# Patient Record
Sex: FEMALE | Race: WHITE | HISPANIC OR LATINO | Employment: OTHER | URBAN - METROPOLITAN AREA
[De-identification: names, ages, dates, MRNs, and addresses within clinical notes are randomized per-mention and may not be internally consistent; named-entity substitution may affect disease eponyms.]

---

## 2017-04-30 ENCOUNTER — HOSPITAL ENCOUNTER (EMERGENCY)
Facility: MEDICAL CENTER | Age: 47
End: 2017-04-30
Attending: EMERGENCY MEDICINE
Payer: MEDICARE

## 2017-04-30 VITALS
WEIGHT: 180.56 LBS | HEART RATE: 87 BPM | OXYGEN SATURATION: 100 % | BODY MASS INDEX: 29.02 KG/M2 | RESPIRATION RATE: 18 BRPM | SYSTOLIC BLOOD PRESSURE: 141 MMHG | HEIGHT: 66 IN | TEMPERATURE: 97.1 F | DIASTOLIC BLOOD PRESSURE: 75 MMHG

## 2017-04-30 DIAGNOSIS — Z76.0 MEDICATION REFILL: ICD-10-CM

## 2017-04-30 DIAGNOSIS — M54.6 ACUTE LEFT-SIDED THORACIC BACK PAIN: ICD-10-CM

## 2017-04-30 DIAGNOSIS — G62.9 NEUROPATHY: ICD-10-CM

## 2017-04-30 DIAGNOSIS — F20.9 SCHIZOPHRENIA, UNSPECIFIED TYPE (HCC): ICD-10-CM

## 2017-04-30 DIAGNOSIS — R20.2 PARESTHESIAS: ICD-10-CM

## 2017-04-30 PROCEDURE — 99282 EMERGENCY DEPT VISIT SF MDM: CPT

## 2017-04-30 RX ORDER — ALBUTEROL SULFATE 90 UG/1
2 AEROSOL, METERED RESPIRATORY (INHALATION) EVERY 6 HOURS PRN
Qty: 1 INHALER | Refills: 3 | Status: SHIPPED | OUTPATIENT
Start: 2017-04-30

## 2017-04-30 RX ORDER — KETOROLAC TROMETHAMINE 30 MG/ML
60 INJECTION, SOLUTION INTRAMUSCULAR; INTRAVENOUS ONCE
Status: DISCONTINUED | OUTPATIENT
Start: 2017-04-30 | End: 2017-04-30 | Stop reason: HOSPADM

## 2017-04-30 ASSESSMENT — PAIN SCALES - GENERAL: PAINLEVEL_OUTOF10: 10

## 2017-04-30 NOTE — ED NOTES
To [facility] 55.  With pain and numbness of both arms and top of left thigh.  States the pain in her arms wakes her up at night.

## 2017-04-30 NOTE — ED AVS SNAPSHOT
Home Care Instructions                                                                                                                Diamond Lazaro   MRN: [id number]    Department:  [facility], Emergency Dept   Date of Visit:  4/30/2017            [facility], Emergency Dept    [address] [place] [id number]    Phone:  [phone]      You were seen by     Rocky Blanton M.D.      Your Diagnosis Was     Acute left-sided thoracic back pain     M54.6       Follow-up Information     1. Follow up with [facility], Emergency Dept.    Specialty:  Emergency Medicine    Why:  If symptoms worsen    Contact information    [address] [place] [id number] [phone]      Medication Information     Review all of your home medications and newly ordered medications with your primary doctor and/or pharmacist as soon as possible. Follow medication instructions as directed by your doctor and/or pharmacist.     Please keep your complete medication list with you and share with your physician. Update the information when medications are discontinued, doses are changed, or new medications (including over-the-counter products) are added; and carry medication information at all times in the event of emergency situations.               Medication List      ASK your doctor about these medications        Instructions    Morning Afternoon Evening Bedtime    * albuterol 108 (90 BASE) MCG/ACT Aers inhalation aerosol   What changed:  Another medication with the same name was added. Make sure you understand how and when to take each.   Ask about: Which instructions should I use?        Inhale 2 Puffs by mouth every 6 hours as needed for Shortness of Breath.   Dose:  2 Puff                        * albuterol 108 (90 BASE) MCG/ACT Aers inhalation aerosol   What changed:  You were already taking a medication with the same name, and this prescription was added.  Make sure you understand how and when to take each.   Ask about: Which instructions should I use?        Inhale 2 Puffs by mouth every 6 hours as needed for Shortness of Breath.   Dose:  2 Puff                        * albuterol 108 (90 BASE) MCG/ACT Aers inhalation aerosol   What changed:  You were already taking a medication with the same name, and this prescription was added. Make sure you understand how and when to take each.   Ask about: Which instructions should I use?        Inhale 2 Puffs by mouth every 6 hours as needed for Shortness of Breath.   Dose:  2 Puff                        alprazolam 1 MG Tabs   Commonly known as:  XANAX        Take 1 mg by mouth 3 times a day.   Dose:  1 mg                        budesonide-formoterol 160-4.5 MCG/ACT Aero   Commonly known as:  SYMBICORT        Inhale 2 Puffs by mouth 2 Times a Day.   Dose:  2 Puff                        fluticasone 50 MCG/ACT nasal spray   Commonly known as:  FLONASE        Spray 2 Sprays in nose every day.   Dose:  2 Spray                        nicotine 21 MG/24HR Pt24   Commonly known as:  NICODERM        Apply 1 Patch to skin as directed every 24 hours.   Dose:  1 Patch                        ziprasidone 60 MG Caps   Commonly known as:  GEODON        Take 60 mg by mouth every day.   Dose:  60 mg                        * Notice:  This list has 3 medication(s) that are the same as other medications prescribed for you. Read the directions carefully, and ask your doctor or other care provider to review them with you.         Where to Get Your Medications      These medications were sent to [facility]  [place] - [address]  [address] [place] [address]     Phone:  [phone]    - albuterol 108 (90 BASE) MCG/ACT Aers inhalation aerosol      You can get these medications from any pharmacy     Bring a paper prescription for each of these medications    - albuterol 108 (90 BASE) MCG/ACT Aers inhalation aerosol                   Discharge Instructions       Please follow-up with your primary care provider for blood pressure management.      Musculoskeletal Pain  Musculoskeletal pain is muscle and brook aches and pains. These pains can occur in any part of the body. Your caregiver may treat you without knowing the cause of the pain. They may treat you if blood or urine tests, X-rays, and other tests were normal.   CAUSES  There is often not a definite cause or reason for these pains. These pains may be caused by a type of germ (virus). The discomfort may also come from overuse. Overuse includes working out too hard when your body is not fit. Brook aches also come from weather changes. Bone is sensitive to atmospheric pressure changes.  HOME CARE INSTRUCTIONS   · Ask when your test results will be ready. Make sure you get your test results.  · Only take over-the-counter or prescription medicines for pain, discomfort, or fever as directed by your caregiver. If you were given medications for your condition, do not drive, operate machinery or power tools, or sign legal documents for 24 hours. Do not drink alcohol. Do not take sleeping pills or other medications that may interfere with treatment.  · Continue all activities unless the activities cause more pain. When the pain lessens, slowly resume normal activities. Gradually increase the intensity and duration of the activities or exercise.  · During periods of severe pain, bed rest may be helpful. Lay or sit in any position that is comfortable.  · Putting ice on the injured area.  1. Put ice in a bag.  2. Place a towel between your skin and the bag.  3. Leave the ice on for 15 to 20 minutes, 3 to 4 times a day.  · Follow up with your caregiver for continued problems and no reason can be found for the pain. If the pain becomes worse or does not go away, it may be necessary to repeat tests or do additional testing. Your caregiver may need to look further for a possible cause.  SEEK  IMMEDIATE MEDICAL CARE IF:  · You have pain that is getting worse and is not relieved by medications.  · You develop chest pain that is associated with shortness or breath, sweating, feeling sick to your stomach (nauseous), or throw up (vomit).  · Your pain becomes localized to the abdomen.  · You develop any new symptoms that seem different or that concern you.  MAKE SURE YOU:   · Understand these instructions.  · Will watch your condition.  · Will get help right away if you are not doing well or get worse.     This information is not intended to replace advice given to you by your health care provider. Make sure you discuss any questions you have with your health care provider.     Document Released: 12/18/2006 Document Revised: 03/11/2013 Document Reviewed: 08/22/2014  [employer] Interactive Patient Education ©2016 [employer].    Peripheral Nerve Problems  Peripheral nerve disorders are problems with the nerves in your arms or legs.  CAUSES   There are many different causes of these disorders. They include:  · Injury.   · Diabetes.   · Chronic alcoholism.   · Toxic chemicals and drugs.   · Vitamin deficiencies.   · Tumors.   · Liver or kidney diseases.   SYMPTOMS   Some of the problems caused are:  · Tingling, burning, pain, and numbness in the extremities and feet.   · Weakness, loss of muscle tone, and size.   DIAGNOSIS   Sometimes blood tests and studies to examine nerve function are needed to make the diagnosis.   TREATMENT   Sometimes peripheral nerve problems can be treated with vitamins, medication, and avoiding known toxins such as alcohol. Please make a follow-up appointment to be sure you are getting better with treatment.   SEEK MEDICAL CARE IF:   · You are not better after one week of treatment.   · You have worsening of problems or have breathing trouble.   Document Released: 01/25/2006 Document Revised: 03/11/2013 Document Reviewed: 12/18/2006  [facility] [employer]® Patient Information ©2013 [employer].            Patient Information     Patient Information    Following emergency treatment: all patient requiring follow-up care must return either to a private physician or a clinic if your condition worsens before you are able to obtain further medical attention, please return to the emergency room.     Billing Information    At [facility], we work to make the billing process streamlined for our patients.  Our Representatives are here to answer any questions you may have regarding your hospital bill.  If you have insurance coverage and have supplied your insurance information to us, we will submit a claim to your insurer on your behalf.  Should you have any questions regarding your bill, we can be reached online or by phone as follows:  Online: You are able pay your bills online or live chat with our representatives about any billing questions you may have. We are here to help Monday - Friday from 8:00am to 7:30pm and 9:00am - 12:00pm on Saturdays.  Please visit [email or web address]  for more information.   Phone:  [phone] or [phone]    Please note that your emergency physician, surgeon, pathologist, radiologist, anesthesiologist, and other specialists are not employed by [facility] and will therefore bill separately for their services.  Please contact them directly for any questions concerning their bills at the numbers below:     Emergency Physician Services:  [phone]  [facility]:  [phone]  [facility]:  [phone]  [facility]:  [phone]    1. Your final bill may vary from the amount quoted upon discharge if all procedures are not complete at that time, or if your doctor has additional procedures of which we are not aware. You will receive an additional bill if you return to the Emergency Department at [facility] for suture removal regardless of the facility of which the sutures were placed.     2. Please  arrange for settlement of this account at the emergency registration.    3. All self-pay accounts are due in full at the time of treatment.  If you are unable to meet this obligation then payment is expected within 4-5 days.     4. If you have had radiology studies (CT, X-ray, Ultrasound, MRI), you have received a preliminary result during your emergency department visit. Please contact the radiology department [phone] to receive a copy of your final result. Please discuss the Final result with your primary physician or with the follow up physician provided.     Crisis Hotline:  [place] Crisis Hotline:  8-673-GVDVSLC or 1-123.415.8434  [place] Crisis Hotline:    1-425.501.7735 or 469-000-5171         ED Discharge Follow Up Questions    1. In order to provide you with very good care, we would like to follow up with a phone call in the next few days.  May we have your permission to contact you?     YES /  NO    2. What is the best phone number to call you? (       )_____-__________    3. What is the best time to call you?      Morning  /  Afternoon  /  Evening                   Patient Signature:  ____________________________________________________________    Date:  ____________________________________________________________

## 2017-04-30 NOTE — ED NOTES
Amb to triage w/ c/o numbness to both arms x 2 wks & LLE x 10 days.  Pt has a hx of chronic back pain.  Pt went and saw her pain management doctor and had a epidural on 4/20.  Pt had the numbness to arms x 2wks, the LLE numbness began shortly after the epidural.  = . Neuro intact.

## 2017-04-30 NOTE — ED PROVIDER NOTES
ED Provider Note    Scribed for Rocky Blanton M.D. by Loida Camejo. 4/30/2017  11:30 AM    Primary care provider: Pcp Pt States None  Means of arrival: Walk-in   History obtained from: Patient  History limited by: None    CHIEF COMPLAINT  Chief Complaint   Patient presents with   • Numbness     BUE & LLE       HPI  Diamond Lazaro is a 46 y.o. female who presents to the Emergency Department for left upper back pain onset one month ago. Patient reports pain has been worsening since onset. Denies chest pain or difficulty breathing. She states intermittent numbness to forearm and tingling to fingers onset four days ago. Denies weakness to upper extremities. Denies diabetes.  Patient reports history of chronic back pain. She states she received an epidural from her pain management to her lumbar spine doctor ten days ago with onset numbness to bilateral arms and left lower extremity. Patient describes numbness from her elbows down diffusely throughout the arm, no focal weakness, she feels some tingling in her fingertips, all 4 of them. Patient does not report left lower extremity numbness at this time. Denies abdominal pain or dysuria. Patient states she takes percocet and xanax at this time for back pain and anxiety. She states associated constipation at this time. Denies taking laxatives for constipation.       I reviewed patient's medical records, patient has history of schizophrenia and chronic pain.     REVIEW OF SYSTEMS  Pertinent positives include numbness to forearms, tingling to fingers. Pertinent negatives include no chest pain, difficulty breathing, weakness to upper extremities, abdominal pain, dysuria.      PAST MEDICAL HISTORY   has a past medical history of ETOH abuse; Psychiatric disorder; Schizophrenia (CMS-[facility]); Insomnia; Anxiety; Bipolar 2 disorder (CMS-HCC); De Quervain's tenosynovitis; GERD (gastroesophageal reflux disease); Hyperlipidemia (6/22/2011); Cellulitis; and Chronic back  "pain.    SURGICAL HISTORY   has past surgical history that includes primary c section; other abdominal surgery; and hemorrhoidectomy.    SOCIAL HISTORY  Social History   Substance Use Topics   • Smoking status: Current Every Day Smoker -- 1.00 packs/day for 25 years     Types: Cigarettes   • Smokeless tobacco: None      Comment: using electronic cigarette   • Alcohol Use: No      Comment: h/o heavy drinking.       History   Drug Use No       FAMILY HISTORY  Family History   Problem Relation Age of Onset   • Hypertension Mother    • Lung Disease Mother      asthma   • Lung Disease Father      COPD   • Diabetes Father    • Hypertension Father        CURRENT MEDICATIONS  Home Medications     Reviewed by Renee Fishman R.N. (Registered Nurse) on 04/30/17 at 1126  Med List Status: Partial    Medication Last Dose Status    albuterol (VENTOLIN OR PROVENTIL) 108 (90 BASE) MCG/ACT AERS 12/17/2016 Active    alprazolam (XANAX) 1 MG Tab 12/16/2016 Active    budesonide-formoterol (SYMBICORT) 160-4.5 MCG/ACT Aerosol  Active    fluticasone (FLONASE) 50 MCG/ACT nasal spray  Active    nicotine (NICODERM) 21 MG/24HR PATCH 24 HR  Active    ziprasidone (GEODON) 60 MG Cap 12/16/2016 Active                ALLERGIES  No Known Allergies    PHYSICAL EXAM  VITAL SIGNS: /90 mmHg  Pulse 100  Temp(Src) 36.2 °C (97.1 °F)  Resp 18  Ht 1.676 m (5' 6\")  Wt 81.9 kg (180 lb 8.9 oz)  BMI 29.16 kg/m2  SpO2 100%  LMP 08/25/2010    Constitutional: Well developed, Well nourished, No distress, Non-toxic appearance.   HENT: Normocephalic, Atraumatic.  Oropharynx moist.   Eyes: PERRL, EOMI, Conjunctiva normal, No discharge.   CV: Good pulses  Thorax & Lungs: No respiratory distress.   Skin: Warm, Dry, No erythema, No rash.    Musculoskeletal: No major deformities noted. Tenderness to left trapezius and rhomboid area, subjective decrease sensation to bilateral forearms and hands  Neurologic: Awake, alert. Moves all extremities spontaneously. " 5/5  strength wrist extension flexion bicep and triceps  Psychiatric: odd affect, Mood normal.    COURSE & MEDICAL DECISION MAKING  Nursing notes, VS, PMSFHx reviewed in chart.    I reviewed patients medical records which patient history of schizophrenia and chronic pain.     11:30 AM - Patient seen and examined at bedside. Patient presents with numbness and tingling to upper extremities. She has history of chronic back pain. She does not have weakness to upper extremities at this time. Discussed with patient to she will be treated with Toradol for pain and she will be discharged with albuterol inhaler. I instructed patient to treat back pain with heat and to massage the area. I advised the patient to follow up with a primary care.  Patient will be treated with Toradol 30 mg.     Patient verbalizes understanding and agreement with discharge  at this time.     Decision Making:  Patient is having some symptoms consistent with bilateral upper arm peripheral neuropathy, it is not following a dermatome therefore I do not believe it is a cervical radiculopathy. I believe this is likely metabolic versus medication, she will need to follow up with her primary care physician for this. The patient does have some upper thoracic back strain, no indication for any x-rays, offer the patient Toradol however the patient refused. We have the patient a prescription for albuterol, have the patient return with worsening symptoms.    The patient will return for new or worsening symptoms and is stable at the time of discharge.    The patient is referred to a primary physician for blood pressure management, diabetic screening, and for all other preventative health concerns.    DISPOSITION:  Patient will be discharged home in stable condition.    FOLLOW UP:  [facility], Emergency Dept  [address] [facility] [address] [phone]    If symptoms worsen      OUTPATIENT MEDICATIONS:  Discharge Medication List  as of 4/30/2017 11:57 AM      START taking these medications    Details   !! albuterol 108 (90 BASE) MCG/ACT Aero Soln inhalation aerosol Inhale 2 Puffs by mouth every 6 hours as needed for Shortness of Breath., Disp-1 Inhaler, R-3, Normal      !! albuterol 108 (90 BASE) MCG/ACT Aero Soln inhalation aerosol Inhale 2 Puffs by mouth every 6 hours as needed for Shortness of Breath., Disp-1 Inhaler, R-3, Print Rx Paper       !! - Potential duplicate medications found. Please discuss with provider.          FINAL IMPRESSION  1. Acute left-sided thoracic back pain    2. Paresthesias    3. Neuropathy (CMS-HCC)    4. Schizophrenia, unspecified type (CMS-HCC)    5. Medication refill        ILoida (Scribe), am scribing for, and in the presence of, Rocky Blanton M.D..    Electronically signed by: Loida Camejo (Scribe), 4/30/2017    I, Rocky Blanton M.D. personally performed the services described in this documentation, as scribed by Loida Camejo in my presence, and it is both accurate and complete.    The note accurately reflects work and decisions made by me.  Rocky Blanton  4/30/2017  6:42 PM

## 2017-04-30 NOTE — DISCHARGE INSTRUCTIONS
Please follow-up with your primary care provider for blood pressure management.      Musculoskeletal Pain  Musculoskeletal pain is muscle and brook aches and pains. These pains can occur in any part of the body. Your caregiver may treat you without knowing the cause of the pain. They may treat you if blood or urine tests, X-rays, and other tests were normal.   CAUSES  There is often not a definite cause or reason for these pains. These pains may be caused by a type of germ (virus). The discomfort may also come from overuse. Overuse includes working out too hard when your body is not fit. Brook aches also come from weather changes. Bone is sensitive to atmospheric pressure changes.  HOME CARE INSTRUCTIONS   · Ask when your test results will be ready. Make sure you get your test results.  · Only take over-the-counter or prescription medicines for pain, discomfort, or fever as directed by your caregiver. If you were given medications for your condition, do not drive, operate machinery or power tools, or sign legal documents for 24 hours. Do not drink alcohol. Do not take sleeping pills or other medications that may interfere with treatment.  · Continue all activities unless the activities cause more pain. When the pain lessens, slowly resume normal activities. Gradually increase the intensity and duration of the activities or exercise.  · During periods of severe pain, bed rest may be helpful. Lay or sit in any position that is comfortable.  · Putting ice on the injured area.  1. Put ice in a bag.  2. Place a towel between your skin and the bag.  3. Leave the ice on for 15 to 20 minutes, 3 to 4 times a day.  · Follow up with your caregiver for continued problems and no reason can be found for the pain. If the pain becomes worse or does not go away, it may be necessary to repeat tests or do additional testing. Your caregiver may need to look further for a possible cause.  SEEK IMMEDIATE MEDICAL CARE IF:  · You have pain  that is getting worse and is not relieved by medications.  · You develop chest pain that is associated with shortness or breath, sweating, feeling sick to your stomach (nauseous), or throw up (vomit).  · Your pain becomes localized to the abdomen.  · You develop any new symptoms that seem different or that concern you.  MAKE SURE YOU:   · Understand these instructions.  · Will watch your condition.  · Will get help right away if you are not doing well or get worse.     This information is not intended to replace advice given to you by your health care provider. Make sure you discuss any questions you have with your health care provider.     Document Released: 12/18/2006 Document Revised: 03/11/2013 Document Reviewed: 08/22/2014  [employer] Interactive Patient Education ©2016 [employer] Inc.    Peripheral Nerve Problems  Peripheral nerve disorders are problems with the nerves in your arms or legs.  CAUSES   There are many different causes of these disorders. They include:  · Injury.   · Diabetes.   · Chronic alcoholism.   · Toxic chemicals and drugs.   · Vitamin deficiencies.   · Tumors.   · Liver or kidney diseases.   SYMPTOMS   Some of the problems caused are:  · Tingling, burning, pain, and numbness in the extremities and feet.   · Weakness, loss of muscle tone, and size.   DIAGNOSIS   Sometimes blood tests and studies to examine nerve function are needed to make the diagnosis.   TREATMENT   Sometimes peripheral nerve problems can be treated with vitamins, medication, and avoiding known toxins such as alcohol. Please make a follow-up appointment to be sure you are getting better with treatment.   SEEK MEDICAL CARE IF:   · You are not better after one week of treatment.   · You have worsening of problems or have breathing trouble.   Document Released: 01/25/2006 Document Revised: 03/11/2013 Document Reviewed: 12/18/2006  [employer]® Patient Information ©2013 [employer].

## 2017-04-30 NOTE — ED AVS SNAPSHOT
4/30/2017    Diamond Lazaro  [address]  [place] [address]    Dear Diamond:    [facility] wants to ensure your discharge home is safe and you or your loved ones have had all of your questions answered regarding your care after you leave the hospital.    Below is a list of resources and contact information should you have any questions regarding your hospital stay, follow-up instructions, or active medical symptoms.    Questions or Concerns Regarding… Contact   Medical Questions Related to Your Discharge  (7 days a week, 8am-5pm) Contact a Nurse Care Coordinator   [phone]   Medical Questions Not Related to Your Discharge  (24 hours a day / 7 days a week)  Contact the Nurse Health Line   [phone]    Medications or Discharge Instructions Refer to your discharge packet   or contact your [facility] Primary Care Provider   [phone]   Follow-up Appointment(s) Schedule your appointment via [employer]   or contact Scheduling [phone]   Billing Review your statement via [employer]  or contact Billing [phone]   Medical Records Review your records via [employer]   or contact Medical Records [phone]     You may receive a telephone call within two days of discharge. This call is to make certain you understand your discharge instructions and have the opportunity to have any questions answered. You can also easily access your medical information, test results and upcoming appointments via the [employer] free online health management tool. You can learn more and sign up at [facility]/[employer]. For assistance setting up your [employer] account, please call [phone].    Once again, we want to ensure your discharge home is safe and that you have a clear understanding of any next steps in your care. If you have any questions or concerns, please do not hesitate to contact us, we are here for you. Thank you for choosing [facility] for your healthcare needs.    Sincerely,    Your [facility] Healthcare Team

## 2017-04-30 NOTE — ED AVS SNAPSHOT
ShareRoot Access Code: Activation code not generated  Current ShareRoot Status: Patient Declined    Your email address is not on file at [facility].  Email Addresses are required for you to sign up for ShareRoot, please contact [phone] to verify your personal information and to provide your email address prior to attempting to register for ShareRoot.    Diamond Carvajal Tejas  [address]  [place] [address]    Pikum[employer]  A secure, online tool to manage your health information     [facility]’s ShareRoot® is a secure, online tool that connects you to your personalized health information from the privacy of your home -- day or night - making it very easy for you to manage your healthcare. Once the activation process is completed, you can even access your medical information using the ShareRoot luciano, which is available for free in the Apple Luciano store or Google Play store.     To learn more about ShareRoot, visit [email or web address]    There are two levels of access available (as shown below):   My Chart Features  [facility] Primary Care Doctor [facility]  Specialists [facility]  Urgent  Care Non-[facility] Primary Care Doctor   Email your healthcare team securely and privately 24/7 X X X    Manage appointments: schedule your next appointment; view details of past/upcoming appointments X      Request prescription refills. X      View recent personal medical records, including lab and immunizations X X X X   View health record, including health history, allergies, medications X X X X   Read reports about your outpatient visits, procedures, consult and ER notes X X X X   See your discharge summary, which is a recap of your hospital and/or ER visit that includes your diagnosis, lab results, and care plan X X  X     How to register for [employer]:  Once your e-mail address has been verified, follow the following steps to sign up for Pikumt.     1. Go to  [email or web address]  2. Click on the Sign Up Now box, which takes you to the  New Member Sign Up page. You will need to provide the following information:  a. Enter your Mobile Backstage Access Code exactly as it appears at the top of this page. (You will not need to use this code after you’ve completed the sign-up process. If you do not sign up before the expiration date, you must request a new code.)   b. Enter your date of birth.   c. Enter your home email address.   d. Click Submit, and follow the next screen’s instructions.  3. Create a Mobile Backstage ID. This will be your Mobile Backstage login ID and cannot be changed, so think of one that is secure and easy to remember.  4. Create a Mobile Backstage password. You can change your password at any time.  5. Enter your Password Reset Question and Answer. This can be used at a later time if you forget your password.   6. Enter your e-mail address. This allows you to receive e-mail notifications when new information is available in Mobile Backstage.  7. Click Sign Up. You can now view your health information.    For assistance activating your Mobile Backstage account, call [phone]

## 2017-04-30 NOTE — ED NOTES
Pt offered toradol shot but denies for it, refuses to get the shot. States that she is just gonna take aspirin at home.

## 2017-04-30 NOTE — ED NOTES
Dismissed ambulatory.  Walks with steady gait.  She verbalizes understanding of her Dc instructions.

## 2017-09-13 ENCOUNTER — HOSPITAL ENCOUNTER (EMERGENCY)
Facility: MEDICAL CENTER | Age: 47
End: 2017-09-13
Attending: EMERGENCY MEDICINE
Payer: MEDICARE

## 2017-09-13 VITALS
HEART RATE: 80 BPM | OXYGEN SATURATION: 98 % | SYSTOLIC BLOOD PRESSURE: 130 MMHG | TEMPERATURE: 98 F | BODY MASS INDEX: 29.41 KG/M2 | DIASTOLIC BLOOD PRESSURE: 70 MMHG | WEIGHT: 182.98 LBS | RESPIRATION RATE: 17 BRPM | HEIGHT: 66 IN

## 2017-09-13 DIAGNOSIS — H10.013 ACUTE FOLLICULAR CONJUNCTIVITIS OF BOTH EYES: ICD-10-CM

## 2017-09-13 PROCEDURE — 99283 EMERGENCY DEPT VISIT LOW MDM: CPT

## 2017-09-13 ASSESSMENT — PAIN SCALES - GENERAL
PAINLEVEL_OUTOF10: 6
PAINLEVEL_OUTOF10: 1

## 2017-09-13 NOTE — DISCHARGE INSTRUCTIONS
Do not use any makeup until the eye irritation resolves  Utilize cool compresses  Placed Lacri-Lube to both eyes 3 times a day  Place erythromycin ointment to both eyes 3 times a day as well  Follow-up with ophthalmology if not improved in 24-48 hours.

## 2017-09-13 NOTE — ED NOTES
"Pt amb to triage.  Chief Complaint   Patient presents with   • Eye Pain     +redness +drainage x2mo \"I got comet in my eyes 2 months ago.\"       "

## 2017-09-13 NOTE — ED PROVIDER NOTES
"ED Provider Note    CHIEF COMPLAINT  Chief Complaint   Patient presents with   • Eye Pain     +redness +drainage x2mo \"I got comet in my eyes 2 months ago.\"       HPI  Diamond Lazaro is a 46 y.o. female who presents to the emergency department bilateral eye discomfort. The patient states she's had this for approximately 2 months. She states that it may have started after she got some, in her eyes. 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 She states her eyes are blurred at times. She is unaware of any other direct trauma. She does not have any headaches. She is not any nausea vomiting. She also denies upper rest for his symptoms.    REVIEW OF SYSTEMS  No fever    PHYSICAL EXAM  VITAL SIGNS: /78   Pulse 96   Temp 36.3 °C (97.3 °F)   Resp 16   Ht 1.676 m (5' 6\")   Wt 83 kg (182 lb 15.7 oz)   LMP 08/25/2010   SpO2 96%   BMI 29.53 kg/m²   In general the patient does not appear toxic  Facial examination no erythema nor edema  Eyes the patient has bilateral conjunctival injection, there is no forcing uptake to each eye, no obvious foreign body, pupils are 2 and reactive bilaterally, extraocular motors intact bilaterally with no apparent discomfort. The intraocular pressure was 19 in the right eye and 21 in the left eye    COURSE & MEDICAL DECISION MAKING  Pertinent Labs & Imaging studies reviewed. (See chart for details)  This a 46-year-old female who presents with bilateral conjunctivitis. I suspect this is from either irritation from her makeup versus dry eyes. I will place the patient on erythromycin for any possible infectious source as this has been ongoing for quite some time. Furthermore this will help moisturize the eyes. She'll also utilize Lacri-Lube on a daily basis and cool compresses. Patient will follow-up with the ophthalmologist if she is not better in 48 hours.    FINAL IMPRESSION  1. Bilateral conjunctivitis     Disposition  The patient will be discharged in stable " condition      Electronically signed by: Farhat To, 9/13/2017 11:36 AM

## 2017-09-13 NOTE — ED NOTES
Pt verbalizes understanding of dc instructions. Pt was given the erythromycin ointment by erp and verbalizes understanding of use. Pt states that she will follow up with opth referal provided. Pt ambulatory to lobby w/o difficulty

## 2017-09-13 NOTE — ED NOTES
Before pt left she said she thought she left her white [employer] cell phone in bathroom. It was not there when we both check. Pt left phone number to call her at in case found [phone]. I found the phone and called number but not in service

## 2017-10-08 ENCOUNTER — HOSPITAL ENCOUNTER (EMERGENCY)
Facility: MEDICAL CENTER | Age: 47
End: 2017-10-08
Attending: EMERGENCY MEDICINE
Payer: MEDICARE

## 2017-10-08 VITALS
HEART RATE: 105 BPM | WEIGHT: 186.29 LBS | SYSTOLIC BLOOD PRESSURE: 145 MMHG | OXYGEN SATURATION: 99 % | RESPIRATION RATE: 18 BRPM | HEIGHT: 66 IN | DIASTOLIC BLOOD PRESSURE: 80 MMHG | BODY MASS INDEX: 29.94 KG/M2 | TEMPERATURE: 97.6 F

## 2017-10-08 DIAGNOSIS — Z72.0 TOBACCO USE: ICD-10-CM

## 2017-10-08 DIAGNOSIS — Z77.120 EXPOSURE TO MOLD: ICD-10-CM

## 2017-10-08 PROCEDURE — 99282 EMERGENCY DEPT VISIT SF MDM: CPT

## 2017-10-08 RX ORDER — HYDROCODONE BITARTRATE AND ACETAMINOPHEN 7.5; 325 MG/1; MG/1
1-2 TABLET ORAL EVERY 6 HOURS PRN
COMMUNITY

## 2017-10-08 ASSESSMENT — LIFESTYLE VARIABLES: DO YOU DRINK ALCOHOL: NO

## 2017-10-08 ASSESSMENT — PAIN SCALES - GENERAL: PAINLEVEL_OUTOF10: 10

## 2017-10-08 NOTE — ED NOTES
Pt to triage stating she found mole under the bathroom tile. Pt with blurred vision, hand tingling, and numbness to legs x 6 months. Pt believes the mole and symptoms are related. Tearful in triage.   Pt advised to return to triage nurse for any changes or concerns.

## 2017-10-08 NOTE — ED NOTES
"Pt has multiple complaints, pt c/o blurred vision, joint stiffness, body aches, hand tingling/numbness, pt is anxious at bedside and tearful, pt states she was putting a new floor in her kitchen and found black mold.  Pt believes she has been exposed for \"a very long time to it\"  "

## 2017-10-08 NOTE — ED PROVIDER NOTES
"ED Provider Note    CHIEF COMPLAINT  Chief Complaint   Patient presents with   • Blurred Vision   • Numbness   • Tingling       HPI  Diamond Lazaro is a 46 y.o. female who presents For evaluation of a constellation of symptoms including intermittent tingling to her extremities, itchy watery eyes, rashes, joint stiffness, body aches. These all been going on for multiple months, she states being evaluated here in ultimately follow-up with an ophthalmologist who has had an ulcerative eyedrops and yet her eyes remain itchy. The family doctor. She has had intermittent coughing and sneezing with some shortness of breath as well. Past medical history includes anxiety, bipolar, schizophrenia as well as alcohol methamphetamine abuse.    REVIEW OF SYSTEMS  Negative for fever, abdominal pain.    PAST MEDICAL HISTORY   has a past medical history of Anxiety; Bipolar 2 disorder (CMS-HCC); Cellulitis; Chronic back pain; De Quervain's tenosynovitis; ETOH abuse; GERD (gastroesophageal reflux disease); Hyperlipidemia (6/22/2011); Insomnia; Psychiatric disorder; and Schizophrenia (CMS-HCC).    SOCIAL HISTORY  Social History     Social History Main Topics   • Smoking status: Current Every Day Smoker     Packs/day: 1.00     Years: 25.00     Types: Cigarettes   • Smokeless tobacco: Not on file      Comment: using electronic cigarette   • Alcohol use No      Comment: h/o heavy drinking.    • Drug use: No   • Sexual activity: Not Currently      Comment: .  in halfway, stating that he gets released 6 months       SURGICAL HISTORY   has a past surgical history that includes primary c section; other abdominal surgery; and hemorrhoidectomy.    CURRENT MEDICATIONS  I personally reviewed the medication list in the charting documentation.     ALLERGIES  No Known Allergies    PHYSICAL EXAM  VITAL SIGNS: /80   Pulse (!) 105   Temp 36.4 °C (97.6 °F)   Resp 18   Ht 1.676 m (5' 6\")   Wt 84.5 kg (186 lb 4.6 oz)   LMP " 08/25/2010   SpO2 99%   BMI 30.07 kg/m²   Constitutional:Extremely anxious appearing  HENT: No signs of trauma.   Eyes: Conjunctiva normal, Non-icteric.   Chest: Normal nonlabored respirations  Skin: No erythema. Erythematous rash involving the axilla  Musculoskeletal: Good range of motion in all major joints.   Neurologic: Alert, No focal deficits noted.   Psychiatric: Anxious    COURSE & MEDICAL DECISION MAKING  Pertinent Labs & Imaging studies reviewed. (See chart for details)    Encounter Summary: This is a 46 y.o. female with multiple chronic symptoms that she thinks are secondary to mold exposure in her bathroom, on exam she is very anxious appearing reviewing her records reveals a history of schizophrenia as well as bipolar and anxiety and methamphetamine abuse. At this point, I don't any acute concerns that would prompt emergent evaluation here in the emergency department. I've contacted Central scheduling department he will come evaluate her here to help facilitate follow-up      DISPOSITION: Discharge Home      FINAL IMPRESSION  1. Exposure to mold    2. Tobacco use        This dictation was created using voice recognition software. The accuracy of the dictation is limited to the abilities of the software. I expect there may be some errors of grammar and possibly content. The nursing notes were reviewed and certain aspects of this information were incorporated into this note.    Electronically signed by: Juanjose Humphrey, 10/8/2017 3:34 PM

## 2017-10-08 NOTE — DISCHARGE INSTRUCTIONS
Return immediately to the Emergency Department if you experience continuing or worsening symptoms or if you develop any new or worsening symptoms including but not limited to fever, chest pain, difficulty breathing, any numbness/weakness/tingling, abdominal pain or any other concerning symptoms.      Mold Allergies  An allergy is an abnormal reaction to a substance by the body's defense system (immune system). Allergies can develop at any age.  WHAT CAUSES ALLERGIES?  An allergic reaction happens when the immune system mistakenly reacts to a normally harmless substance, called an allergen, as if it were harmful. The immune system releases antibodies to fight the substance. Antibodies eventually release a chemical called histamine into the bloodstream. The release of histamine is meant to protect the body from infection, but it also causes discomfort.  An allergic reaction can be triggered by:  · Eating an allergen.  · Inhaling an allergen.  · Touching an allergen.  WHAT TYPES OF ALLERGIES ARE THERE?  There are many types of allergies. Common types include:  · Seasonal allergies. People with this type of allergy are usually allergic to substances that are only present during certain seasons, such as molds and pollens.  · Food allergies.  · Drug allergies.  · Insect allergies.  · Animal dander allergies.  WHAT ARE SYMPTOMS OF ALLERGIES?  Possible allergy symptoms include:  · Swelling of the lips, face, tongue, mouth, or throat.  · Sneezing, coughing, or wheezing.  · Nasal congestion.  · Tingling in the mouth.  · Rash.  · Itching.  · Itchy, red, swollen areas of skin (hives).  · Watery eyes.  · Vomiting.  · Diarrhea.  · Dizziness.  · Lightheadedness.  · Fainting.  · Trouble breathing or swallowing.  · Chest tightness.  · Rapid heartbeat.  HOW ARE ALLERGIES DIAGNOSED?  Allergies are diagnosed with a medical and family history and one or more of the following:  · Skin tests.  · Blood tests.  · A food diary. A food diary  is a record of all the foods and drinks you have in a day and of all the symptoms you experience.  · The results of an elimination diet. An elimination diet involves eliminating foods from your diet and then adding them back in one by one to find out if a certain food causes an allergic reaction.  HOW ARE ALLERGIES TREATED?  There is no cure for allergies, but allergic reactions can be treated with medicine. Severe reactions usually need to be treated at a hospital.  HOW CAN REACTIONS BE PREVENTED?  The best way to prevent an allergic reaction is by avoiding the substance you are allergic to. Allergy shots and medicines can also help prevent reactions in some cases. People with severe allergic reactions may be able to prevent a life-threatening reaction called anaphylaxis with a medicine given right after exposure to the allergen.     This information is not intended to replace advice given to you by your health care provider. Make sure you discuss any questions you have with your health care provider.     Document Released: 03/12/2004 Document Revised: 01/08/2016 Document Reviewed: 09/29/2015  [employer] Interactive Patient Education ©2016 [employer]

## 2017-10-13 ENCOUNTER — OFFICE VISIT (OUTPATIENT)
Dept: MEDICAL GROUP | Facility: PHYSICIAN GROUP | Age: 47
End: 2017-10-13
Payer: MEDICARE

## 2017-10-13 VITALS
RESPIRATION RATE: 20 BRPM | SYSTOLIC BLOOD PRESSURE: 132 MMHG | HEIGHT: 66 IN | WEIGHT: 181 LBS | TEMPERATURE: 98.1 F | OXYGEN SATURATION: 96 % | HEART RATE: 105 BPM | BODY MASS INDEX: 29.09 KG/M2 | DIASTOLIC BLOOD PRESSURE: 82 MMHG

## 2017-10-13 DIAGNOSIS — R29.810 FACIAL DROOP: ICD-10-CM

## 2017-10-13 DIAGNOSIS — Z13.1 SCREENING FOR DIABETES MELLITUS (DM): ICD-10-CM

## 2017-10-13 DIAGNOSIS — Z13.6 SCREENING FOR CARDIOVASCULAR CONDITION: ICD-10-CM

## 2017-10-13 DIAGNOSIS — R06.02 SHORTNESS OF BREATH: ICD-10-CM

## 2017-10-13 DIAGNOSIS — Z12.31 ENCOUNTER FOR SCREENING MAMMOGRAM FOR BREAST CANCER: ICD-10-CM

## 2017-10-13 DIAGNOSIS — F41.9 ANXIETY: ICD-10-CM

## 2017-10-13 DIAGNOSIS — F20.9 SCHIZOPHRENIA, UNSPECIFIED TYPE (HCC): ICD-10-CM

## 2017-10-13 DIAGNOSIS — Z77.120 MOLD EXPOSURE: ICD-10-CM

## 2017-10-13 DIAGNOSIS — F31.81 BIPOLAR 2 DISORDER (HCC): ICD-10-CM

## 2017-10-13 PROCEDURE — 99203 OFFICE O/P NEW LOW 30 MIN: CPT | Performed by: PHYSICIAN ASSISTANT

## 2017-10-13 ASSESSMENT — PAIN SCALES - GENERAL: PAINLEVEL: NO PAIN

## 2017-10-13 ASSESSMENT — PATIENT HEALTH QUESTIONNAIRE - PHQ9: CLINICAL INTERPRETATION OF PHQ2 SCORE: 0

## 2017-10-13 NOTE — ASSESSMENT & PLAN NOTE
She states her bathroom has mold.  presents with multiple symptoms.  she has been experiencing itchy watery red eyes, fatigue, rash under left arm, joint stiffness, body aches, increased shortness of breath that subsided one week ago, numbness and tingling of bilateral upper and lower extremities, fatigue for several months and believes it's due to mold exposure in her home. She was seen and treated in the emergency room on 10/8/17.  she followed up with her ophthalmologist recently and was prescribed steroid eyedrops to help alleviate red itchy eyes.  eye symptoms have improved since using eyedrops.  she is contacted poison control and her apartment management.  2 days ago  became and sanded down baseboards and drywall.  he removed a piece the wall from the bathroom and painted over the mold that was in the kitchen. Patient denies chronic cough, nausea, vomiting, hemoptysis, night sweats, weight loss, fever, chills, wheezing, chest pain, palpitations, edema. Patient has a positive history for schizophrenia, anxiety, methamphetamine use, bipolar 2 disorder.
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
## 2018-01-16 ENCOUNTER — HOSPITAL ENCOUNTER (EMERGENCY)
Dept: HOSPITAL 8 - ED | Age: 48
Discharge: HOME | End: 2018-01-16
Payer: MEDICARE

## 2018-01-16 VITALS — WEIGHT: 179.9 LBS | BODY MASS INDEX: 28.91 KG/M2 | HEIGHT: 66 IN

## 2018-01-16 VITALS — SYSTOLIC BLOOD PRESSURE: 154 MMHG | DIASTOLIC BLOOD PRESSURE: 89 MMHG

## 2018-01-16 DIAGNOSIS — M79.1: ICD-10-CM

## 2018-01-16 DIAGNOSIS — J00: ICD-10-CM

## 2018-01-16 DIAGNOSIS — B34.9: Primary | ICD-10-CM

## 2018-01-16 PROCEDURE — 71046 X-RAY EXAM CHEST 2 VIEWS: CPT

## 2018-01-16 PROCEDURE — 99285 EMERGENCY DEPT VISIT HI MDM: CPT

## 2018-01-16 PROCEDURE — 96372 THER/PROPH/DIAG INJ SC/IM: CPT

## 2018-01-16 PROCEDURE — 87400 INFLUENZA A/B EACH AG IA: CPT

## 2018-02-19 ENCOUNTER — HOSPITAL ENCOUNTER (EMERGENCY)
Facility: MEDICAL CENTER | Age: 48
End: 2018-02-19
Payer: MEDICARE

## 2018-02-19 VITALS
WEIGHT: 199.3 LBS | HEIGHT: 66 IN | OXYGEN SATURATION: 98 % | RESPIRATION RATE: 16 BRPM | TEMPERATURE: 100.4 F | DIASTOLIC BLOOD PRESSURE: 83 MMHG | HEART RATE: 106 BPM | SYSTOLIC BLOOD PRESSURE: 142 MMHG | BODY MASS INDEX: 32.03 KG/M2

## 2018-02-19 PROCEDURE — 302449 STATCHG TRIAGE ONLY (STATISTIC)

## 2018-02-19 ASSESSMENT — PAIN SCALES - GENERAL: PAINLEVEL_OUTOF10: 10

## 2018-02-19 NOTE — ED NOTES
NO ANSWER IN WAITING ROOM, REGISTRATION SAID PT ASKED FOR A WAIT TIME RECENTLY BUT HASN'T SEEN HER.

## 2018-04-17 ENCOUNTER — HOSPITAL ENCOUNTER (OUTPATIENT)
Dept: RADIOLOGY | Facility: MEDICAL CENTER | Age: 48
End: 2018-04-17
Attending: ANESTHESIOLOGY
Payer: MEDICARE

## 2018-04-17 DIAGNOSIS — M54.5 LOW BACK PAIN, UNSPECIFIED BACK PAIN LATERALITY, UNSPECIFIED CHRONICITY, WITH SCIATICA PRESENCE UNSPECIFIED: ICD-10-CM

## 2018-04-17 PROCEDURE — 72148 MRI LUMBAR SPINE W/O DYE: CPT

## 2018-07-02 ENCOUNTER — HOSPITAL ENCOUNTER (EMERGENCY)
Facility: MEDICAL CENTER | Age: 48
End: 2018-07-02
Attending: EMERGENCY MEDICINE
Payer: MEDICARE

## 2018-07-02 VITALS
HEART RATE: 108 BPM | DIASTOLIC BLOOD PRESSURE: 92 MMHG | HEIGHT: 66 IN | WEIGHT: 181.22 LBS | BODY MASS INDEX: 29.12 KG/M2 | SYSTOLIC BLOOD PRESSURE: 144 MMHG | RESPIRATION RATE: 16 BRPM | TEMPERATURE: 98.2 F | OXYGEN SATURATION: 98 %

## 2018-07-02 DIAGNOSIS — F43.21 SITUATIONAL DEPRESSION: ICD-10-CM

## 2018-07-02 DIAGNOSIS — G89.29 CHRONIC DENTAL PAIN: ICD-10-CM

## 2018-07-02 DIAGNOSIS — K08.9 CHRONIC DENTAL PAIN: ICD-10-CM

## 2018-07-02 PROCEDURE — 99283 EMERGENCY DEPT VISIT LOW MDM: CPT

## 2018-07-02 RX ORDER — IBUPROFEN 800 MG/1
800 TABLET ORAL EVERY 8 HOURS PRN
Qty: 30 TAB | Refills: 0 | Status: SHIPPED | OUTPATIENT
Start: 2018-07-02

## 2018-07-02 RX ORDER — PENICILLIN V POTASSIUM 500 MG/1
500 TABLET ORAL 4 TIMES DAILY
Qty: 28 TAB | Refills: 0 | Status: SHIPPED | OUTPATIENT
Start: 2018-07-02 | End: 2018-07-03

## 2018-07-02 ASSESSMENT — PAIN SCALES - GENERAL
PAINLEVEL_OUTOF10: 10
PAINLEVEL_OUTOF10: 10

## 2018-07-02 NOTE — ED TRIAGE NOTES
"Diamond Lazaro  Chief Complaint   Patient presents with   • Jaw Pain     RIGHT lower jaw   • Tooth Ache     Pt ambulatory to triage with above complaint. Pt states she was seen at dentist today, and states \"I couldn't make the down payment and so they turned me away.\" Pt reports needing a tooth extraction with bone graft. Pt states she is unable to eat and can no longer tolerate the pain.    /92   Pulse (!) 108   Temp 36.8 °C (98.2 °F)   Resp 16   Ht 1.676 m (5' 6\")   Wt 82.2 kg (181 lb 3.5 oz)   LMP 08/25/2010   SpO2 98%   BMI 29.25 kg/m²     Pt informed of triage process and encouraged to notify staff of any changes or concerns. Pt verbalized understanding of instructions. Pt placed back in lobby.     "

## 2018-07-03 ENCOUNTER — OFFICE VISIT (OUTPATIENT)
Dept: URGENT CARE | Facility: CLINIC | Age: 48
End: 2018-07-03
Payer: MEDICARE

## 2018-07-03 VITALS
WEIGHT: 181 LBS | BODY MASS INDEX: 29.09 KG/M2 | HEIGHT: 66 IN | RESPIRATION RATE: 16 BRPM | TEMPERATURE: 98.7 F | HEART RATE: 95 BPM | OXYGEN SATURATION: 97 % | SYSTOLIC BLOOD PRESSURE: 104 MMHG | DIASTOLIC BLOOD PRESSURE: 76 MMHG

## 2018-07-03 DIAGNOSIS — K04.7 TOOTH ABSCESS: Primary | ICD-10-CM

## 2018-07-03 PROCEDURE — 99213 OFFICE O/P EST LOW 20 MIN: CPT | Performed by: PHYSICIAN ASSISTANT

## 2018-07-03 RX ORDER — AMOXICILLIN AND CLAVULANATE POTASSIUM 875; 125 MG/1; MG/1
1 TABLET, FILM COATED ORAL 2 TIMES DAILY
Qty: 14 TAB | Refills: 0 | Status: SHIPPED | OUTPATIENT
Start: 2018-07-03 | End: 2018-07-10

## 2018-07-03 NOTE — DISCHARGE INSTRUCTIONS
Dental Pain  Introduction  Dental pain may be caused by many things, including:  · Tooth decay (cavities or caries). Cavities cause the nerve of your tooth to be open to air and hot or cold temperatures. This can cause pain or discomfort.  · Abscess or infection. A dental abscess is an area that is full of infected pus from a bacterial infection in the inner part of the tooth (pulp). It usually happens at the end of the tooth’s root.  · Injury.  · An unknown reason (idiopathic).  Your pain may be mild or severe. It may only happen when:  · You are chewing.  · You are exposed to hot or cold temperature.  · You are eating or drinking sugary foods or beverages, such as:  ¨ Soda.  ¨ Candy.  Your pain may also be there all of the time.  Follow these instructions at home:  Watch your dental pain for any changes. Do these things to lessen your discomfort:  · Take medicines only as told by your dentist.  · If your dentist tells you to take an antibiotic medicine, finish all of it even if you start to feel better.  · Keep all follow-up visits as told by your dentist. This is important.  · Do not apply heat to the outside of your face.  · Rinse your mouth or gargle with salt water if told by your dentist. This helps with pain and swelling.  ¨ You can make salt water by adding ¼ tsp of salt to 1 cup of warm water.  · Apply ice to the painful area of your face:  ¨ Put ice in a plastic bag.  ¨ Place a towel between your skin and the bag.  ¨ Leave the ice on for 20 minutes, 2-3 times per day.  · Avoid foods or drinks that cause you pain, such as:  ¨ Very hot or very cold foods or drinks.  ¨ Sweet or sugary foods or drinks.  Contact a doctor if:  · Your pain is not helped with medicines.  · Your symptoms are worse.  · You have new symptoms.  Get help right away if:  · You cannot open your mouth.  · You are having trouble breathing or swallowing.  · You have a fever.  · Your face, neck, or jaw is puffy (swollen).  This information  is not intended to replace advice given to you by your health care provider. Make sure you discuss any questions you have with your health care provider.  Document Released: 06/05/2009 Document Revised: 05/25/2017 Document Reviewed: 12/14/2015  © 2017 [employer]

## 2018-07-03 NOTE — ED NOTES
"Patient at this time, after discussion with provider about treatment and discharge, begins walking to door to leave. When asked if she would like her discharge paperwork, patient replies, \"If you're not going to help me I'm just going to leave.\" Patient refuses discharge instructions, prescriptions, discharge vitals, and motrin as ordered by physician. Phone called and left message that her prescriptions, should she decide to take them after all, would be waiting at  if she changes her mind. Patient ambulatory out the door.  "

## 2018-07-03 NOTE — ED PROVIDER NOTES
ED Provider Note    Scribed for Lucretia Oquendo M.D. by Estuardo Grant. 7/2/2018, 5:25 PM.    Primary care provider: Caitlyn Mcelroy P.A.-C.  Means of arrival: walk in   History obtained from: patient   History limited by: none     CHIEF COMPLAINT  Chief Complaint   Patient presents with   • Jaw Pain     RIGHT lower jaw   • Tooth Ache       HPI  Diamond Lazaro is a 47 y.o. female who presents to the Emergency Department with a tooth ache and right lower jaw pain onset two months ago. She reports that she tried to see a dentist today but did not have enough money to make the down payment and her insurance will not cover the visit for another 5 months. She reports that she needs a tooth extraction with a bone graft and reports that she is unable to eat and can no longer tolerate the pain. Patient reports that she did not want to come today and only did because her mom said she needed to but does not want to be here. She denies any fever, chills, nausea, vomiting, weakness, dizziness, abdominal pain at this time.     REVIEW OF SYSTEMS  Pertinent positives include jaw pain and right sided dental pain. Pertinent negatives include no fever, chills, nausea, vomiting, weakness, dizziness, abdominal pain.   See HPI for further details.     PAST MEDICAL HISTORY   has a past medical history of Anxiety; Bipolar 2 disorder (CMS-[facility]) ([facility]); Cellulitis; Chronic back pain; De Quervain's tenosynovitis; ETOH abuse; GERD (gastroesophageal reflux disease); Hyperlipidemia (6/22/2011); Insomnia; Psychiatric disorder; and Schizophrenia (CMS-HCC) ([facility]).    SURGICAL HISTORY  Past Surgical History:   Procedure Laterality Date   • HEMORRHOIDECTOMY     • OTHER ABDOMINAL SURGERY      hemorrrhoids   • PRIMARY C SECTION         SOCIAL HISTORY  Social History   Substance Use Topics   • Smoking status: Current Every Day Smoker     Packs/day: 1.00     Years: 25.00     Types: Cigarettes   • Smokeless tobacco: Never Used   • Alcohol use No       "Comment: h/o heavy drinking.       History   Drug Use No     Comment: PCP       FAMILY HISTORY  Family History   Problem Relation Age of Onset   • Hypertension Mother    • Lung Disease Mother      asthma   • Lung Disease Father      COPD   • Diabetes Father    • Hypertension Father    • Other Sister      Fibromyalgia    • Thyroid Sister    • No Known Problems Brother    • Alzheimer's Disease Maternal Grandmother    • Diabetes Maternal Grandfather      Type II   • Lung Disease Paternal Grandmother      COPD   • No Known Problems Daughter        CURRENT MEDICATIONS  No current facility-administered medications on file prior to encounter.      Current Outpatient Prescriptions on File Prior to Encounter   Medication Sig Dispense Refill   • hydrocodone-acetaminophen (NORCO) 7.5-325 MG per tablet Take 1-2 Tabs by mouth every 6 hours as needed.     • albuterol 108 (90 BASE) MCG/ACT Aero Soln inhalation aerosol Inhale 2 Puffs by mouth every 6 hours as needed for Shortness of Breath. 1 Inhaler 3   • albuterol 108 (90 BASE) MCG/ACT Aero Soln inhalation aerosol Inhale 2 Puffs by mouth every 6 hours as needed for Shortness of Breath. 1 Inhaler 3   • alprazolam (XANAX) 1 MG Tab Take 1 mg by mouth 3 times a day.     • ziprasidone (GEODON) 60 MG Cap Take 60 mg by mouth every day.     • albuterol (VENTOLIN OR PROVENTIL) 108 (90 BASE) MCG/ACT AERS Inhale 2 Puffs by mouth every 6 hours as needed for Shortness of Breath. 8.5 g 3     ALLERGIES  No Known Allergies    PHYSICAL EXAM  VITAL SIGNS: /92   Pulse (!) 108   Temp 36.8 °C (98.2 °F)   Resp 16   Ht 1.676 m (5' 6\")   Wt 82.2 kg (181 lb 3.5 oz)   LMP 08/25/2010   SpO2 98%   BMI 29.25 kg/m²   Vitals reviewed.  Consitutional: Well-developed, well-nourished. Negative for: distress.  HENT: Tooth 29 is at the gum line with surrounding erythema but no swelling. Tooth 30 has a carry and is tender to palpation. Tongue has full excursion. No intraoral abscesses. oropharynx clear " and moist.  Eyes: Conjunctivae normal, negative for left and right eye discharge.  Neck: Range of motion normal, supple.   Musculoskeletal: Normal range of motion.  Neurological: Alert and oriented x3.  Skin: Warm, dry. Negative for: erythema, rash.  Psych: Mood/affect normal, behavior normal.    COURSE & MEDICAL DECISION MAKING  Nursing notes, VS, PMSFHx reviewed in chart.    Obtained and reviewed past medical records from 6/20 and she filled a prescription for 150 oxycodone.     5:25 PM - Patient seen and examined at bedside. The patient presents with right sided tooth and jaw pain ongoing for two months seeking dental extraction. Patient will be treated with Motrin 800 mg PO for her symptoms. I informed the patient I would right her a prescription for outpatient antibiotics and discharge home and she was advised to follow up with dentist and she was agreeable at this time.     Patient stormed out with medication or prescription or list of dental services.    The patient will return for new or worsening symptoms and is stable at the time of discharge.    DISPOSITION:  Patient will be discharged home in stable condition.    FOLLOW UP:  Caitlyn Mcelroy P.A.-C.  [address]  [facility] [address]  [facility] [id number] [phone]    Call in 1 day  for help with chronic pain      OUTPATIENT MEDICATIONS:  New Prescriptions    IBUPROFEN (MOTRIN) 800 MG TAB    Take 1 Tab by mouth every 8 hours as needed for Moderate Pain or Inflammation.    PENICILLIN V POTASSIUM (VEETID) 500 MG TAB    Take 1 Tab by mouth 4 times a day for 7 days.         FINAL IMPRESSION  1. Chronic dental pain    2. Situational depression          Estuardo HERNANDEZ (Malia), am scribing for, and in the presence of, Lucretia Oquendo M.D..    Electronically signed by: Estuardo Grant ([email or web address]), 7/2/2018    Lucretia HERNANDEZ M.D. personally performed the services described in this documentation, as scribed by Estuardo Grant in my presence, and it is both accurate and  complete.    The note accurately reflects work and decisions made by me.  Lucretia Oquendo  7/2/2018  7:03 PM

## 2018-07-04 NOTE — PATIENT INSTRUCTIONS
Abscessed Tooth  An abscessed tooth is an infection around your tooth. It may be caused by holes or damage to the tooth (cavity) or a dental disease. An abscessed tooth causes mild to very bad pain in and around the tooth. See your dentist right away if you have tooth or gum pain.  HOME CARE  · Take your medicine as told. Finish it even if you start to feel better.  · Do not drive after taking pain medicine.  · Rinse your mouth (gargle) often with salt water (¼ teaspoon salt in 8 ounces of warm water).  · Do not apply heat to the outside of your face.  GET HELP RIGHT AWAY IF:   · You have a temperature by mouth above 102° F (38.9° C), not controlled by medicine.  · You have chills and a very bad headache.  · You have problems breathing or swallowing.  · Your mouth will not open.  · You develop puffiness (swelling) on the neck or around the eye.  · Your pain is not helped by medicine.  · Your pain is getting worse instead of better.  MAKE SURE YOU:   · Understand these instructions.  · Will watch your condition.  · Will get help right away if you are not doing well or get worse.  Document Released: 06/05/2009 Document Revised: 03/11/2013 Document Reviewed: 03/27/2012  [employer]® Patient Information ©2014 [employer].

## 2018-07-04 NOTE — PROGRESS NOTES
Subjective:      Pt is a 47 y.o. female who presents with Tooth Abscess ((R) infection and needs antibiotics, swollen, cant open jaw)            HPI  Pt notes right lower jaw tooth abscess x 5 days. Pt has not taken any Rx medications for this condition. Pt states the pain is a 7/10 with opening jaw, aching in nature and worse at night. Pt denies CP, SOB, NVD, paresthesias, headaches, dizziness, change in vision, hives, or other joint pain. The pt's medication list, problem list, and allergies have been evaluated and reviewed during today's visit.    .  PMH:  Past Medical History:   Diagnosis Date   • Anxiety    • Bipolar 2 disorder (CMS-HCC) ([facility])    • Cellulitis    • Chronic back pain    • De Quervain's tenosynovitis    • ETOH abuse    • GERD (gastroesophageal reflux disease)    • Hyperlipidemia 2011   • Insomnia    • Psychiatric disorder     depression   • Schizophrenia (CMS-HCC) ([facility])        PSH:  Past Surgical History:   Procedure Laterality Date   • HEMORRHOIDECTOMY     • OTHER ABDOMINAL SURGERY      hemorrrhoids   • PRIMARY C SECTION         Fam Hx:    family history includes Alzheimer's Disease in her maternal grandmother; Diabetes in her father and maternal grandfather; Hypertension in her father and mother; Lung Disease in her father, mother, and paternal grandmother; No Known Problems in her brother and daughter; Other in her sister; Thyroid in her sister.  Family Status   Relation Status   • Mother Alive   • Father    • Sister Alive   • Brother Alive   • Maternal Grandmother    • Maternal Grandfather    • Paternal Grandmother    • Brother    • Brother Alive   • Daughter Alive       Soc HX:  Social History     Social History   • Marital status: Legally      Spouse name: N/A   • Number of children: N/A   • Years of education: N/A     Occupational History   • Not on file.     Social History Main Topics   • Smoking status: Current Every Day Smoker    Packs/day: 1.00     Years: 25.00     Types: Cigarettes   • Smokeless tobacco: Never Used   • Alcohol use No      Comment: h/o heavy drinking.    • Drug use: No      Comment: PCP   • Sexual activity: Not Currently      Comment: .  in prison     Other Topics Concern   • Not on file     Social History Narrative   • No narrative on file         Medications:    Current Outpatient Prescriptions:   •  amoxicillin-clavulanate (AUGMENTIN) 875-125 MG Tab, Take 1 Tab by mouth 2 times a day for 7 days., Disp: 14 Tab, Rfl: 0  •  ibuprofen (MOTRIN) 800 MG Tab, Take 1 Tab by mouth every 8 hours as needed for Moderate Pain or Inflammation., Disp: 30 Tab, Rfl: 0  •  hydrocodone-acetaminophen (NORCO) 7.5-325 MG per tablet, Take 1-2 Tabs by mouth every 6 hours as needed., Disp: , Rfl:   •  albuterol 108 (90 BASE) MCG/ACT Aero Soln inhalation aerosol, Inhale 2 Puffs by mouth every 6 hours as needed for Shortness of Breath., Disp: 1 Inhaler, Rfl: 3  •  albuterol 108 (90 BASE) MCG/ACT Aero Soln inhalation aerosol, Inhale 2 Puffs by mouth every 6 hours as needed for Shortness of Breath., Disp: 1 Inhaler, Rfl: 3  •  alprazolam (XANAX) 1 MG Tab, Take 1 mg by mouth 3 times a day., Disp: , Rfl:   •  ziprasidone (GEODON) 60 MG Cap, Take 60 mg by mouth every day., Disp: , Rfl:   •  albuterol (VENTOLIN OR PROVENTIL) 108 (90 BASE) MCG/ACT AERS, Inhale 2 Puffs by mouth every 6 hours as needed for Shortness of Breath., Disp: 8.5 g, Rfl: 3      Allergies:  Patient has no known allergies.    ROS  Constitutional: Negative for fever, chills and malaise/fatigue.   HENT: Negative for congestion and sore throat.  +tooth abscess  Eyes: Negative for blurred vision, double vision and photophobia.   Respiratory: Negative for cough and shortness of breath.    Cardiovascular: Negative for chest pain and palpitations.   Gastrointestinal: Negative for heartburn, nausea, vomiting, abdominal pain, diarrhea and constipation.   Genitourinary: Negative  "for dysuria and flank pain.   Musculoskeletal: Negative for joint pain and myalgias.   Skin: Negative for itching and rash.   Neurological: Negative for dizziness, tingling and headaches.   Endo/Heme/Allergies: Does not bruise/bleed easily.   Psychiatric/Behavioral: Negative for depression. The patient is not nervous/anxious.           Objective:     /76   Pulse 95   Temp 37.1 °C (98.7 °F)   Resp 16   Ht 1.676 m (5' 6\")   Wt 82.1 kg (181 lb)   LMP 08/25/2010   SpO2 97%   BMI 29.21 kg/m²      Physical Exam   HENT:   Mouth/Throat: Oropharynx is clear and moist and mucous membranes are normal. She does not have dentures. No oral lesions. No trismus in the jaw. Abnormal dentition. Dental abscesses and dental caries present. No uvula swelling or lacerations.              Constitutional: PT is oriented to person, place, and time. PT appears well-developed and well-nourished. No distress.   HENT:   Head: Normocephalic and atraumatic.   Eyes: Conjunctivae normal and EOM are normal. Pupils are equal, round, and reactive to light.   Neck: Normal range of motion. Neck supple. No thyromegaly present.   Cardiovascular: Normal rate, regular rhythm, normal heart sounds and intact distal pulses.  Exam reveals no gallop and no friction rub.    No murmur heard.  Pulmonary/Chest: Effort normal and breath sounds normal. No respiratory distress. PT has no wheezes. PT has no rales. Pt exhibits no tenderness.   Abdominal: Soft. Bowel sounds are normal. PT exhibits no distension and no mass. There is no tenderness. There is no rebound and no guarding.   Musculoskeletal: Normal range of motion. PT exhibits no edema and no tenderness.   Neurological: PT is alert and oriented to person, place, and time. PT has normal reflexes. No cranial nerve deficit.   Skin: Skin is warm and dry. No rash noted. PT is not diaphoretic. No erythema.       Psychiatric: PT has a normal mood and affect. PT behavior is normal. Judgment and thought " content normal.        Assessment/Plan:     1. Tooth abscess    - amoxicillin-clavulanate (AUGMENTIN) 875-125 MG Tab; Take 1 Tab by mouth 2 times a day for 7 days.  Dispense: 14 Tab; Refill: 0    Rest, fluids encouraged.  OTC NSAIDs for pain   Pt to follow up with dentist on 7/17/18  AVS with medical info given.  Pt was in full understanding and agreement with the plan.  Follow-up as needed if symptoms worsen or fail to improve.

## 2018-07-09 ENCOUNTER — OFFICE VISIT (OUTPATIENT)
Dept: URGENT CARE | Facility: CLINIC | Age: 48
End: 2018-07-09
Payer: MEDICARE

## 2018-07-09 VITALS
SYSTOLIC BLOOD PRESSURE: 132 MMHG | TEMPERATURE: 98.6 F | DIASTOLIC BLOOD PRESSURE: 90 MMHG | HEART RATE: 109 BPM | RESPIRATION RATE: 16 BRPM | HEIGHT: 66 IN | BODY MASS INDEX: 29.09 KG/M2 | WEIGHT: 181 LBS | OXYGEN SATURATION: 97 %

## 2018-07-09 DIAGNOSIS — H10.023 OTHER MUCOPURULENT CONJUNCTIVITIS OF BOTH EYES: ICD-10-CM

## 2018-07-09 PROCEDURE — 99214 OFFICE O/P EST MOD 30 MIN: CPT | Performed by: EMERGENCY MEDICINE

## 2018-07-09 RX ORDER — MOXIFLOXACIN 5 MG/ML
1 SOLUTION/ DROPS OPHTHALMIC 3 TIMES DAILY
Qty: 1 BOTTLE | Refills: 0 | Status: SHIPPED | OUTPATIENT
Start: 2018-07-09

## 2018-07-09 RX ORDER — KETOROLAC TROMETHAMINE 5 MG/ML
1 SOLUTION OPHTHALMIC 4 TIMES DAILY
Qty: 1 BOTTLE | Refills: 0 | Status: SHIPPED | OUTPATIENT
Start: 2018-07-09

## 2018-07-09 ASSESSMENT — ENCOUNTER SYMPTOMS
NAUSEA: 0
BLURRED VISION: 1
EYE REDNESS: 1
EYE DISCHARGE: 1
DOUBLE VISION: 0
EYE PAIN: 1
FEVER: 0
FOREIGN BODY SENSATION: 0
VOMITING: 0
EYE ITCHING: 0

## 2018-07-09 NOTE — PROGRESS NOTES
Subjective:      Diamond Lazaro is a 47 y.o. female who presents with Eye Problem (exposed to mold is causing redness and blurry vision x 3 weeks )            Eye Problem    Both eyes are affected.This is a recurrent problem. Episode onset: 3 weeks. The problem has been unchanged. There was no injury mechanism. There is no known exposure to pink eye. She does not wear contacts. Associated symptoms include blurred vision, an eye discharge and eye redness. Pertinent negatives include no double vision, fever, foreign body sensation, itching, nausea, recent URI or vomiting. She has tried nothing for the symptoms.   Patient notes similar episodes in past, corresponding with potential reaction to mold exposure. Has seen ophthalmologist in the past; treatment successful, but symptoms recurrent.    Review of Systems   Constitutional: Negative for fever.   Eyes: Positive for blurred vision, pain, discharge and redness. Negative for double vision and itching.   Gastrointestinal: Negative for nausea and vomiting.   Skin: Negative for rash.     PMH:  has a past medical history of Anxiety; Bipolar 2 disorder ([facility]); Cellulitis; Chronic back pain; De Quervain's tenosynovitis; ETOH abuse; GERD (gastroesophageal reflux disease); Hyperlipidemia (6/22/2011); Insomnia; Psychiatric disorder; and Schizophrenia ([facility]).  MEDS:   Current Outpatient Prescriptions:   •  moxifloxacin (VIGAMOX) 0.5 % Solution, Place 1 Drop in both eyes 3 times a day., Disp: 1 Bottle, Rfl: 0  •  ketorolac (ACULAR) 0.5 % Solution, Place 1 Drop in both eyes 4 times a day., Disp: 1 Bottle, Rfl: 0  •  amoxicillin-clavulanate (AUGMENTIN) 875-125 MG Tab, Take 1 Tab by mouth 2 times a day for 7 days., Disp: 14 Tab, Rfl: 0  •  ibuprofen (MOTRIN) 800 MG Tab, Take 1 Tab by mouth every 8 hours as needed for Moderate Pain or Inflammation., Disp: 30 Tab, Rfl: 0  •  hydrocodone-acetaminophen (NORCO) 7.5-325 MG per tablet, Take 1-2 Tabs by mouth every 6 hours as  "needed., Disp: , Rfl:   •  albuterol 108 (90 BASE) MCG/ACT Aero Soln inhalation aerosol, Inhale 2 Puffs by mouth every 6 hours as needed for Shortness of Breath., Disp: 1 Inhaler, Rfl: 3  •  albuterol 108 (90 BASE) MCG/ACT Aero Soln inhalation aerosol, Inhale 2 Puffs by mouth every 6 hours as needed for Shortness of Breath., Disp: 1 Inhaler, Rfl: 3  •  alprazolam (XANAX) 1 MG Tab, Take 1 mg by mouth 3 times a day., Disp: , Rfl:   •  ziprasidone (GEODON) 60 MG Cap, Take 60 mg by mouth every day., Disp: , Rfl:   •  albuterol (VENTOLIN OR PROVENTIL) 108 (90 BASE) MCG/ACT AERS, Inhale 2 Puffs by mouth every 6 hours as needed for Shortness of Breath., Disp: 8.5 g, Rfl: 3  ALLERGIES: No Known Allergies  SURGHX:   Past Surgical History:   Procedure Laterality Date   • HEMORRHOIDECTOMY     • OTHER ABDOMINAL SURGERY      hemorrrhoids   • PRIMARY C SECTION       SOCHX:  reports that she has been smoking Cigarettes.  She has a 25.00 pack-year smoking history. She has never used smokeless tobacco. She reports that she does not use drugs.  FH: family history includes Alzheimer's Disease in her maternal grandmother; Diabetes in her father and maternal grandfather; Hypertension in her father and mother; Lung Disease in her father, mother, and paternal grandmother; No Known Problems in her brother and daughter; Other in her sister; Thyroid in her sister.     Objective:     /90   Pulse (!) 109   Temp 37 °C (98.6 °F)   Resp 16   Ht 1.676 m (5' 6\")   Wt 82.1 kg (181 lb)   LMP 08/25/2010   SpO2 97%   BMI 29.21 kg/m²      Physical Exam   Constitutional: She is oriented to person, place, and time. She appears well-developed and well-nourished. She is cooperative. She does not have a sickly appearance. She does not appear ill.   HENT:   Head: Normocephalic and atraumatic.   Nose: No rhinorrhea.   Mouth/Throat: Mucous membranes are normal.   Eyes: EOM and lids are normal. Right eye exhibits discharge. Right eye exhibits no " chemosis, no exudate and no hordeolum. Left eye exhibits discharge. Left eye exhibits no chemosis, no exudate and no hordeolum. Right conjunctiva is injected. Right conjunctiva has no hemorrhage. Left conjunctiva is injected. Left conjunctiva has no hemorrhage.   No clear fluorescein corneal uptake with limited magnification.   Neck: Phonation normal.   Pulmonary/Chest: Effort normal.   Lymphadenopathy:        Head (right side): No preauricular adenopathy present.        Head (left side): No preauricular adenopathy present.   Neurological: She is alert and oriented to person, place, and time.   Skin: Skin is warm and dry. No rash noted.   Psychiatric: She has a normal mood and affect.               Assessment/Plan:     1. Other mucopurulent conjunctivitis of both eyes  - moxifloxacin (VIGAMOX) 0.5 % Solution; Place 1 Drop in both eyes 3 times a day.  Dispense: 1 Bottle; Refill: 0  - ketorolac (ACULAR) 0.5 % Solution; Place 1 Drop in both eyes 4 times a day.  Dispense: 1 Bottle; Refill: 0  - REFERRAL TO OPHTHALMOLOGY

## 2018-07-11 ENCOUNTER — HOSPITAL ENCOUNTER (EMERGENCY)
Dept: HOSPITAL 8 - ED | Age: 48
Discharge: HOME | End: 2018-07-11
Payer: MEDICARE

## 2018-07-11 VITALS — DIASTOLIC BLOOD PRESSURE: 87 MMHG | SYSTOLIC BLOOD PRESSURE: 135 MMHG

## 2018-07-11 VITALS — HEIGHT: 67 IN | WEIGHT: 180.78 LBS | BODY MASS INDEX: 28.37 KG/M2

## 2018-07-11 DIAGNOSIS — H10.33: Primary | ICD-10-CM

## 2018-07-11 DIAGNOSIS — F17.210: ICD-10-CM

## 2018-07-11 DIAGNOSIS — Z76.0: ICD-10-CM

## 2018-07-11 PROCEDURE — 99283 EMERGENCY DEPT VISIT LOW MDM: CPT

## 2018-07-12 ENCOUNTER — OFFICE VISIT (OUTPATIENT)
Dept: MEDICAL GROUP | Facility: PHYSICIAN GROUP | Age: 48
End: 2018-07-12
Payer: MEDICARE

## 2018-07-12 VITALS
SYSTOLIC BLOOD PRESSURE: 108 MMHG | HEIGHT: 66 IN | HEART RATE: 97 BPM | OXYGEN SATURATION: 97 % | TEMPERATURE: 98.5 F | RESPIRATION RATE: 18 BRPM | WEIGHT: 182.2 LBS | DIASTOLIC BLOOD PRESSURE: 80 MMHG | BODY MASS INDEX: 29.28 KG/M2

## 2018-07-12 DIAGNOSIS — F20.9 SCHIZOPHRENIA, UNSPECIFIED TYPE (HCC): ICD-10-CM

## 2018-07-12 DIAGNOSIS — F41.9 ANXIETY: ICD-10-CM

## 2018-07-12 DIAGNOSIS — H10.33 ACUTE CONJUNCTIVITIS OF BOTH EYES, UNSPECIFIED ACUTE CONJUNCTIVITIS TYPE: ICD-10-CM

## 2018-07-12 DIAGNOSIS — F31.81 BIPOLAR 2 DISORDER (HCC): ICD-10-CM

## 2018-07-12 PROCEDURE — 99214 OFFICE O/P EST MOD 30 MIN: CPT | Performed by: PHYSICIAN ASSISTANT

## 2018-07-12 ASSESSMENT — PAIN SCALES - GENERAL: PAINLEVEL: NO PAIN

## 2018-07-16 ENCOUNTER — OFFICE VISIT (OUTPATIENT)
Dept: URGENT CARE | Facility: CLINIC | Age: 48
End: 2018-07-16
Payer: MEDICARE

## 2018-07-16 VITALS
OXYGEN SATURATION: 98 % | HEIGHT: 66 IN | RESPIRATION RATE: 16 BRPM | WEIGHT: 182 LBS | HEART RATE: 100 BPM | SYSTOLIC BLOOD PRESSURE: 140 MMHG | BODY MASS INDEX: 29.25 KG/M2 | TEMPERATURE: 98.3 F | DIASTOLIC BLOOD PRESSURE: 86 MMHG

## 2018-07-16 DIAGNOSIS — H10.9 CONJUNCTIVITIS OF BOTH EYES, UNSPECIFIED CONJUNCTIVITIS TYPE: Primary | ICD-10-CM

## 2018-07-16 PROBLEM — H10.33 ACUTE CONJUNCTIVITIS OF BOTH EYES: Status: ACTIVE | Noted: 2018-07-16

## 2018-07-16 PROCEDURE — 99214 OFFICE O/P EST MOD 30 MIN: CPT | Performed by: PHYSICIAN ASSISTANT

## 2018-07-16 RX ORDER — GABAPENTIN 400 MG/1
400 CAPSULE ORAL 3 TIMES DAILY
COMMUNITY

## 2018-07-16 RX ORDER — MOXIFLOXACIN 5 MG/ML
1 SOLUTION/ DROPS OPHTHALMIC 3 TIMES DAILY
Qty: 1 BOTTLE | Refills: 0 | Status: SHIPPED | OUTPATIENT
Start: 2018-07-23 | End: 2018-07-28

## 2018-07-16 RX ORDER — PREDNISONE 20 MG/1
20 TABLET ORAL DAILY
Qty: 10 TAB | Refills: 0 | Status: SHIPPED | OUTPATIENT
Start: 2018-07-16

## 2018-07-17 NOTE — PROGRESS NOTES
Chief Complaint   Patient presents with   • Other     Pt states she is not really cohearent, family issues causing nervous break down   • Eye Problem     Pt states she is having issue with eyes       HISTORY OF PRESENT ILLNESS: Diamond Lazaro is an established 47 y.o. female here to discuss the evaluation and management of:      Patient is here today to discuss several health concerns. Patient states recently to family members passed away within a month. States she is currently grieving. Patient has a positive medical history for schizophrenia, bipolar 1 and anxiety. Patient has a positive past medical history for cocaine, methamphetamine and marijuana use. States she used IV drug use for 4 years and stopped 2 years ago. She tells me she always up with her psychiatrist Dr. Tamayo regularly. She tells me she is currently prescribed Xanax 1 mg three times a day and Geodon 60 milligrams tablet once daily. She tells me she is compliant with medications and experiences no side effects or complications from medications. Patient appears sedated during today's appointment. Denies any suicidal or homicidal ideation. Emphasized importance of healthy diet and exercise. Discussed that should the patient have any symptoms they should call suicide prevention hotline or report to the emergency room immediately. She denies illicit drug use or alcohol abuse.    Patient is also complaining of conjunctivitis of bilateral eyes. She was seen at urgent care on 7/19/18 for symptoms. Patient was prescribed - moxifloxacin (VIGAMOX) 0.5 % Solution; Place 1 Drop in both eyes 3 times a day and ketorolac (ACULAR) 0.5 % Solution; Place 1 Drop in both eyes 4 times a day.   she tells me she has been compliant with medications and experiences no side effects or complications from medications. She states prior to using medication she is expecting blurry vision but vision has improved since initiating medication. Patient's concerned that  symptoms are associated with mold exposure in apartment. Denies eye injury or pinkeye exposure. Denies double vision, fever, pruritus, upper respiratory symptoms.       Patient Active Problem List    Diagnosis Date Noted   • Influenza Viral syndrome 12/17/2016     Priority: High   • Cellulitis 03/03/2013     Priority: High   • Acute bronchitis 12/18/2016     Priority: Low   • Low back pain 11/15/2011     Priority: Low   • Schizophrenia ([facility]) 02/10/2011     Priority: Low   • Tobacco dependence 02/10/2011     Priority: Low   • Bipolar 2 disorder ([facility]) 02/10/2011     Priority: Low   • Acute conjunctivitis of both eyes 07/16/2018   • Mold exposure 10/13/2017   • Facial droop 10/13/2017   • Anxiety 10/13/2017   • Hyperlipidemia 06/22/2011   • ETOH abuse 02/10/2011   • De Quervain's tenosynovitis 02/10/2011   • Wrist pain, left 02/10/2011   • GERD (gastroesophageal reflux disease) 02/10/2011       Allergies:Patient has no known allergies.    Current Outpatient Prescriptions   Medication Sig Dispense Refill   • moxifloxacin (VIGAMOX) 0.5 % Solution Place 1 Drop in both eyes 3 times a day. 1 Bottle 0   • ketorolac (ACULAR) 0.5 % Solution Place 1 Drop in both eyes 4 times a day. 1 Bottle 0   • ibuprofen (MOTRIN) 800 MG Tab Take 1 Tab by mouth every 8 hours as needed for Moderate Pain or Inflammation. 30 Tab 0   • gabapentin (NEURONTIN) 400 MG Cap Take 400 mg by mouth 3 times a day.     • predniSONE (DELTASONE) 20 MG Tab Take 1 Tab by mouth every day. 10 Tab 0   • [START ON 7/23/2018] moxifloxacin (VIGAMOX) 0.5 % Solution Place 1 Drop in both eyes 3 times a day for 5 days. 1 Bottle 0   • hydrocodone-acetaminophen (NORCO) 7.5-325 MG per tablet Take 1-2 Tabs by mouth every 6 hours as needed.     • albuterol 108 (90 BASE) MCG/ACT Aero Soln inhalation aerosol Inhale 2 Puffs by mouth every 6 hours as needed for Shortness of Breath. 1 Inhaler 3   • albuterol 108 (90 BASE) MCG/ACT Aero Soln inhalation aerosol Inhale 2 Puffs by mouth  every 6 hours as needed for Shortness of Breath. 1 Inhaler 3   • alprazolam (XANAX) 1 MG Tab Take 1 mg by mouth 3 times a day.     • ziprasidone (GEODON) 60 MG Cap Take 60 mg by mouth every day.     • albuterol (VENTOLIN OR PROVENTIL) 108 (90 BASE) MCG/ACT AERS Inhale 2 Puffs by mouth every 6 hours as needed for Shortness of Breath. 8.5 g 3     No current facility-administered medications for this visit.        Social History   Substance Use Topics   • Smoking status: Current Every Day Smoker     Packs/day: 1.00     Years: 25.00     Types: Cigarettes   • Smokeless tobacco: Never Used   • Alcohol use Not on file      Comment: h/o heavy drinking.        Family Status   Relation Status   • Mother Alive   • Father    • Sister Alive   • Brother Alive   • Maternal Grandmother    • Maternal Grandfather    • Paternal Grandmother    • Brother    • Brother Alive   • Daughter Alive     Family History   Problem Relation Age of Onset   • Hypertension Mother    • Lung Disease Mother      asthma   • Lung Disease Father      COPD   • Diabetes Father    • Hypertension Father    • Other Sister      Fibromyalgia    • Thyroid Sister    • No Known Problems Brother    • Alzheimer's Disease Maternal Grandmother    • Diabetes Maternal Grandfather      Type II   • Lung Disease Paternal Grandmother      COPD   • No Known Problems Daughter        ROS:  Review of Systems   Constitutional: Negative for fever, chills, weight loss and malaise/fatigue.   HENT: Negative for ear pain, nosebleeds, congestion, sore throat and neck pain.  Positive for blurred vision, discharge and redness.   Eyes: Negative for blurred vision.   Respiratory: Negative for cough, sputum production, shortness of breath and wheezing.    Cardiovascular: Negative for chest pain, palpitations, orthopnea and leg swelling.   Gastrointestinal: Negative for heartburn, nausea, vomiting and abdominal pain.   Genitourinary: Negative for  "dysuria, urgency and frequency.   Musculoskeletal: Negative for myalgias, back pain and joint pain.   Skin: Negative for rash and itching.   Neurological: Negative for dizziness, tingling, tremors, sensory change, focal weakness and headaches.   Endo/Heme/Allergies: Does not bruise/bleed easily.   Psychiatric/Behavioral: Negative for depression, suicidal ideas and memory loss.  The patient is not nervous/anxious and does not have insomnia.    All other systems reviewed and are negative except as in HPI.    Exam: Blood pressure 108/80, pulse 97, temperature 36.9 °C (98.5 °F), resp. rate 18, height 1.676 m (5' 6\"), weight 82.6 kg (182 lb 3.2 oz), last menstrual period 08/25/2010, SpO2 97 %, not currently breastfeeding. Body mass index is 29.41 kg/m².  General: Normal appearing. No distress. Patient appears sedated.  HEENT: Normocephalic. Bilateral eyes exhibit discharge. Bilateral conjunctiva is injected. No signs of injury or hemorrhage. Pupils equal and reactive to light accommodation, ears normal shape and contour, canals are clear bilaterally, tympanic membranes are benign, nasal mucosa benign, oropharynx is without erythema, edema or exudates.   Neck: Supple without JVD or bruit. Thyroid is not enlarged.  Pulmonary: Clear to ausculation.  Normal effort. No rales, ronchi, or wheezing.  Cardiovascular: Regular rate and rhythm without murmur. Carotid and radial pulses are intact and equal bilaterally.  Abdomen: Soft, nontender, nondistended. Normal bowel sounds. Liver and spleen are not palpable  Neurologic: Grossly nonfocal.  Cranial nerves are normal. LE DTR's normal and symmetric.  Lymph: No cervical, supraclavicular or axillary lymph nodes are palpable  Skin: Warm and dry.  No rashes or suspicious skin lesions.  Musculoskeletal: Normal gait. No extremity cyanosis, clubbing, or edema.  Psych: Normal mood and affect. Alert and oriented x3. Judgment and insight is normal.    Medical decision-making and " discussion:  1. Schizophrenia, unspecified type (HCC)  2. Bipolar 2 disorder (HCC)  3. Anxiety  Patient appeared sedated during today's appointment.  Continue following up with psychiatry as indicated. Continue current medication regimen. Advised patient to not misuse or abuse medication. Avoid illicit drug use or alcohol use.  Patient is feeling well on current medications. Denies any suicidal or homicidal ideation. Emphasized importance of healthy diet and exercise. Discussed that should the patient have any symptoms they should call suicide prevention hotline or report to the emergency room immediately.      4. Acute conjunctivitis of both eyes, unspecified acute conjunctivitis type  She tells me symptoms are improving. Patient is wearing sunglasses during today's appointment. Continue current medication regimen. Advised patient to practice good hand hygiene. Avoid irritating eyes.     Follow-up for worsening symptoms,lack of expected recovery, or should new symptoms or problems arise.      Please note that this dictation was created using voice recognition software. I have made every reasonable attempt to correct obvious errors, but I expect that there are errors of grammar and possibly content that I did not discover before finalizing the note.      Return if symptoms worsen or fail to improve.

## 2018-07-18 ENCOUNTER — TELEPHONE (OUTPATIENT)
Dept: URGENT CARE | Facility: CLINIC | Age: 48
End: 2018-07-18

## 2018-07-19 ASSESSMENT — ENCOUNTER SYMPTOMS
EYE REDNESS: 1
EYE DISCHARGE: 1
BLURRED VISION: 1

## 2018-07-19 NOTE — PROGRESS NOTES
Subjective:      Diamond Lazaro is a 47 y.o. female who presents with Blurred Vision (head ahces. Pt stases its due to black mold from her house sence 11/17)    PMH:  has a past medical history of Anxiety; Bipolar 2 disorder (HCC); Cellulitis; Chronic back pain; De Quervain's tenosynovitis; ETOH abuse; GERD (gastroesophageal reflux disease); Hyperlipidemia (6/22/2011); Insomnia; Psychiatric disorder; and Schizophrenia (HCC).  MEDS:   Current Outpatient Prescriptions:   •  gabapentin (NEURONTIN) 400 MG Cap, Take 400 mg by mouth 3 times a day., Disp: , Rfl:   •  predniSONE (DELTASONE) 20 MG Tab, Take 1 Tab by mouth every day., Disp: 10 Tab, Rfl: 0  •  [START ON 7/23/2018] moxifloxacin (VIGAMOX) 0.5 % Solution, Place 1 Drop in both eyes 3 times a day for 5 days., Disp: 1 Bottle, Rfl: 0  •  alprazolam (XANAX) 1 MG Tab, Take 1 mg by mouth 3 times a day., Disp: , Rfl:   •  ziprasidone (GEODON) 60 MG Cap, Take 60 mg by mouth every day., Disp: , Rfl:   •  moxifloxacin (VIGAMOX) 0.5 % Solution, Place 1 Drop in both eyes 3 times a day., Disp: 1 Bottle, Rfl: 0  •  ketorolac (ACULAR) 0.5 % Solution, Place 1 Drop in both eyes 4 times a day., Disp: 1 Bottle, Rfl: 0  •  ibuprofen (MOTRIN) 800 MG Tab, Take 1 Tab by mouth every 8 hours as needed for Moderate Pain or Inflammation., Disp: 30 Tab, Rfl: 0  •  hydrocodone-acetaminophen (NORCO) 7.5-325 MG per tablet, Take 1-2 Tabs by mouth every 6 hours as needed., Disp: , Rfl:   •  albuterol 108 (90 BASE) MCG/ACT Aero Soln inhalation aerosol, Inhale 2 Puffs by mouth every 6 hours as needed for Shortness of Breath., Disp: 1 Inhaler, Rfl: 3  •  albuterol 108 (90 BASE) MCG/ACT Aero Soln inhalation aerosol, Inhale 2 Puffs by mouth every 6 hours as needed for Shortness of Breath., Disp: 1 Inhaler, Rfl: 3  •  albuterol (VENTOLIN OR PROVENTIL) 108 (90 BASE) MCG/ACT AERS, Inhale 2 Puffs by mouth every 6 hours as needed for Shortness of Breath., Disp: 8.5 g, Rfl: 3  ALLERGIES: No Known  "Allergies  SURGHX:   Past Surgical History:   Procedure Laterality Date   • HEMORRHOIDECTOMY     • OTHER ABDOMINAL SURGERY      hemorrrhoids   • PRIMARY C SECTION       SOCHX:  reports that she has been smoking Cigarettes.  She has a 25.00 pack-year smoking history. She has never used smokeless tobacco. She reports that she does not use drugs.  FH: family history includes Alzheimer's Disease in her maternal grandmother; Diabetes in her father and maternal grandfather; Hypertension in her father and mother; Lung Disease in her father, mother, and paternal grandmother; No Known Problems in her brother and daughter; Other in her sister; Thyroid in her sister.          Patient presents with:  Blurred Vision: head ahces. Pt stases its due to black mold from her house sence 11/17.  PT was seen 7 days ago in UC for same complaint given rx for viagmox and nsaid drops; was seen 4 days ago by PCP for regular appointment but expressed concern regarding eyes at that appointment as well.  Pt was encouraged to continue eye Rx.      Pt has been seen a number of times (ER, PCP, Eye doctor, ) for this complaint and is very frustrated that we cannot help her prove it is black mold causing this problem.  I listened to patient's explanation and tried to encourage her to seek help from the health department or housing authority in [place], which she states she has already done.           Blurred Vision   This is a recurrent problem. The current episode started more than 1 month ago. The problem occurs intermittently. The problem has been waxing and waning. Exacerbated by: mold per pt. Treatments tried: rx eye drops. Improvement on treatment: gets better than worse.       Review of Systems   Eyes: Positive for blurred vision, discharge and redness.   All other systems reviewed and are negative.         Objective:     /86   Pulse 100   Temp 36.8 °C (98.3 °F)   Resp 16   Ht 1.676 m (5' 6\")   Wt 82.6 kg (182 lb)   LMP 08/25/2010  "  SpO2 98%   BMI 29.38 kg/m²      Physical Exam   Constitutional: She is oriented to person, place, and time. She appears well-developed and well-nourished. No distress.   HENT:   Head: Normocephalic.   Eyes: EOM and lids are normal. Pupils are equal, round, and reactive to light. Right eye exhibits discharge. Left eye exhibits discharge. Right conjunctiva is injected. Left conjunctiva is injected.   Neck: Normal range of motion. Neck supple.   Cardiovascular: Normal rate, regular rhythm and normal heart sounds.    Pulmonary/Chest: Effort normal and breath sounds normal.   Musculoskeletal: Normal range of motion.   Lymphadenopathy:     She has no cervical adenopathy.   Neurological: She is alert and oriented to person, place, and time.   Skin: Skin is warm and dry.   Psychiatric: She has a normal mood and affect.   Nursing note and vitals reviewed.              Assessment/Plan:     1. Conjunctivitis of both eyes, unspecified conjunctivitis type  predniSONE (DELTASONE) 20 MG Tab    moxifloxacin (VIGAMOX) 0.5 % Solution       PT is to use the oral steroids first to see if it helps her symptoms, as she has already used the moxifloxacin.      The moxifloxicin has been post dated to July 23 so that she does not use drops until oral steroids have been completed.      Pt has referral to [facility] but is not sure they will take her insurance.  Pt can call her previous eye care provider and see if they will see her again.  If she needs referral to them, she can call with the name and number of that practice.     PT should follow up with PCP in 1-2 days for re-evaluation if symptoms have not improved.  Discussed red flags and reasons to return to UC or ED.  Pt and/or family verbalized understanding of diagnosis and follow up instructions and was offered informational handout on diagnosis.  PT discharged.

## 2018-07-23 ENCOUNTER — TELEPHONE (OUTPATIENT)
Dept: MEDICAL GROUP | Facility: PHYSICIAN GROUP | Age: 48
End: 2018-07-23

## 2018-07-23 NOTE — TELEPHONE ENCOUNTER
1. Caller Name: Diamond                                       Call Back Number: [phone] (home)       Patient approves a detailed voicemail message: yes    Patient called stating she was informed by PCP to go to the health department for  however she is not getting anywhere and is asking if you can arrange for her to see a .  She also would like a new rx for Flonase sent to her pharmacy on file.  Aware PCP is out of the office today and will address requests upon return, she is fine with that.

## 2018-07-24 ENCOUNTER — PATIENT OUTREACH (OUTPATIENT)
Dept: HEALTH INFORMATION MANAGEMENT | Facility: OTHER | Age: 48
End: 2018-07-24

## 2018-07-24 DIAGNOSIS — Z59.19 UNSATISFACTORY LIVING CONDITIONS: ICD-10-CM

## 2018-07-24 SDOH — ECONOMIC STABILITY - HOUSING INSECURITY: OTHER INADEQUATE HOUSING: Z59.19

## 2018-08-06 ENCOUNTER — HOSPITAL ENCOUNTER (EMERGENCY)
Facility: MEDICAL CENTER | Age: 48
End: 2018-08-06
Payer: MEDICARE

## 2018-08-06 ENCOUNTER — TELEPHONE (OUTPATIENT)
Dept: MEDICAL GROUP | Facility: PHYSICIAN GROUP | Age: 48
End: 2018-08-06

## 2018-08-06 VITALS
TEMPERATURE: 99 F | SYSTOLIC BLOOD PRESSURE: 157 MMHG | OXYGEN SATURATION: 96 % | DIASTOLIC BLOOD PRESSURE: 104 MMHG | HEIGHT: 66 IN | HEART RATE: 100 BPM | BODY MASS INDEX: 29.41 KG/M2 | WEIGHT: 182.98 LBS | RESPIRATION RATE: 16 BRPM

## 2018-08-06 PROCEDURE — 302449 STATCHG TRIAGE ONLY (STATISTIC)

## 2018-08-06 NOTE — ED NOTES
Pt to  stating she made an appointment with her PCP and doesn't want to wait any longer, pt signed out ama

## 2018-08-06 NOTE — TELEPHONE ENCOUNTER
VOICEMAIL  1. Caller Name: Diamond                    Call Back Number: [phone] (home)     2. Message: Patient called and left a lengthy message and was tearful.  It was difficult to understand what her request was.     3. Patient approves office to leave a detailed voicemail/MyChart message: yes

## 2018-08-06 NOTE — TELEPHONE ENCOUNTER
I spoke with patient and she states she was questioning when her eye appointment was however she called the eye doctor after calling us and has arranged an appointment.

## 2018-08-07 ENCOUNTER — OFFICE VISIT (OUTPATIENT)
Dept: URGENT CARE | Facility: CLINIC | Age: 48
End: 2018-08-07
Payer: MEDICARE

## 2018-08-07 ENCOUNTER — TELEPHONE (OUTPATIENT)
Dept: MEDICAL GROUP | Facility: PHYSICIAN GROUP | Age: 48
End: 2018-08-07

## 2018-08-07 VITALS
HEART RATE: 101 BPM | WEIGHT: 182 LBS | SYSTOLIC BLOOD PRESSURE: 128 MMHG | TEMPERATURE: 98.8 F | RESPIRATION RATE: 16 BRPM | BODY MASS INDEX: 29.25 KG/M2 | DIASTOLIC BLOOD PRESSURE: 66 MMHG | OXYGEN SATURATION: 96 % | HEIGHT: 66 IN

## 2018-08-07 DIAGNOSIS — L71.0 PERIORAL DERMATITIS: ICD-10-CM

## 2018-08-07 DIAGNOSIS — R09.81 NASAL CONGESTION: ICD-10-CM

## 2018-08-07 DIAGNOSIS — H57.89 EYE IRRITATION: ICD-10-CM

## 2018-08-07 PROCEDURE — 99214 OFFICE O/P EST MOD 30 MIN: CPT | Performed by: FAMILY MEDICINE

## 2018-08-07 RX ORDER — DOXYCYCLINE HYCLATE 100 MG
100 TABLET ORAL EVERY 12 HOURS
Qty: 20 TAB | Refills: 0 | Status: SHIPPED | OUTPATIENT
Start: 2018-08-07 | End: 2018-08-17

## 2018-08-07 RX ORDER — FLUTICASONE PROPIONATE 50 MCG
2 SPRAY, SUSPENSION (ML) NASAL
Qty: 1 BOTTLE | Refills: 6 | Status: SHIPPED | OUTPATIENT
Start: 2018-08-07

## 2018-08-07 ASSESSMENT — ENCOUNTER SYMPTOMS
CHILLS: 0
EYE DISCHARGE: 1
EYE REDNESS: 1
FOCAL WEAKNESS: 0
FEVER: 0
DIZZINESS: 0

## 2018-08-07 NOTE — TELEPHONE ENCOUNTER
1. Caller Name: Diamond                                         Call Back Number: [phone] (home)       Patient approves a detailed voicemail message: yes    Patient called stating she was just seen at Urgent Care for pus that is coming out of her arm.  She believes she has an infection from the mold in her house.  Patient is requesting PCP order lab work to see what infection she has or if she has an infection.  Routing to PCP to advise.

## 2018-08-07 NOTE — PROGRESS NOTES
Subjective:      Diamond Lazaro is a 47 y.o. female who presents with Eye Problem (sensitive to light, pus under arm, pus on face when washing it )    Chief Complaint   Patient presents with   • Eye Problem     sensitive to light, pus under arm, pus on face when washing it         - This is a very pleasant 47 y.o. female with complaints of nasal congestion x 1 month    - eye irritation (itchy dry sensitive-light draining at times) x 4-5 months    - bumps on face past 1-2 months          ALLERGIES:  Patient has no known allergies.     PMH:  Past Medical History:   Diagnosis Date   • Anxiety    • Bipolar 2 disorder (HCC)    • Cellulitis    • Chronic back pain    • De Quervain's tenosynovitis    • ETOH abuse    • GERD (gastroesophageal reflux disease)    • Hyperlipidemia 6/22/2011   • Insomnia    • Psychiatric disorder     depression   • Schizophrenia ([facility])         MEDS:    Current Outpatient Prescriptions:   •  fluticasone (FLONASE) 50 MCG/ACT nasal spray, Spray 2 Sprays in nose every bedtime., Disp: 1 Bottle, Rfl: 6  •  doxycycline (VIBRAMYCIN) 100 MG Tab, Take 1 Tab by mouth every 12 hours for 10 days., Disp: 20 Tab, Rfl: 0  •  Polyethyl Glyc-Propyl Glyc PF (SYSTANE) 0.4-0.3 % Solution, Place 1 Drop in both eyes as needed., Disp: 1 Each, Rfl: 6  •  gabapentin (NEURONTIN) 400 MG Cap, Take 400 mg by mouth 3 times a day., Disp: , Rfl:   •  moxifloxacin (VIGAMOX) 0.5 % Solution, Place 1 Drop in both eyes 3 times a day., Disp: 1 Bottle, Rfl: 0  •  ketorolac (ACULAR) 0.5 % Solution, Place 1 Drop in both eyes 4 times a day., Disp: 1 Bottle, Rfl: 0  •  ibuprofen (MOTRIN) 800 MG Tab, Take 1 Tab by mouth every 8 hours as needed for Moderate Pain or Inflammation., Disp: 30 Tab, Rfl: 0  •  alprazolam (XANAX) 1 MG Tab, Take 1 mg by mouth 3 times a day., Disp: , Rfl:   •  ziprasidone (GEODON) 60 MG Cap, Take 60 mg by mouth every day., Disp: , Rfl:   •  predniSONE (DELTASONE) 20 MG Tab, Take 1 Tab by mouth every day.,  "Disp: 10 Tab, Rfl: 0  •  hydrocodone-acetaminophen (NORCO) 7.5-325 MG per tablet, Take 1-2 Tabs by mouth every 6 hours as needed., Disp: , Rfl:   •  albuterol 108 (90 BASE) MCG/ACT Aero Soln inhalation aerosol, Inhale 2 Puffs by mouth every 6 hours as needed for Shortness of Breath., Disp: 1 Inhaler, Rfl: 3  •  albuterol 108 (90 BASE) MCG/ACT Aero Soln inhalation aerosol, Inhale 2 Puffs by mouth every 6 hours as needed for Shortness of Breath., Disp: 1 Inhaler, Rfl: 3  •  albuterol (VENTOLIN OR PROVENTIL) 108 (90 BASE) MCG/ACT AERS, Inhale 2 Puffs by mouth every 6 hours as needed for Shortness of Breath., Disp: 8.5 g, Rfl: 3    ** I have documented what I find to be significant in regards to past medical, social, family and surgical history  in my HPI or under PMH/PSH/FH review section, otherwise it is contributory **           HPI    Review of Systems   Constitutional: Negative for chills and fever.   Eyes: Positive for discharge and redness.   Neurological: Negative for dizziness and focal weakness.          Objective:     /66   Pulse (!) 101   Temp 37.1 °C (98.8 °F)   Resp 16   Ht 1.676 m (5' 6\")   Wt 82.6 kg (182 lb)   LMP 08/25/2010   SpO2 96%   BMI 29.38 kg/m²      Physical Exam   Constitutional: She appears well-developed. No distress.   HENT:   Head: Normocephalic and atraumatic.   Neck: Neck supple.   Cardiovascular: Regular rhythm.    No murmur heard.  Pulmonary/Chest: Effort normal. No respiratory distress.   Neurological: She is alert. She exhibits normal muscle tone.   Skin: Skin is warm and dry.   Psychiatric: She has a normal mood and affect. Judgment normal.   Nursing note and vitals reviewed.  eye exam unremarkable, maybe seem a little dry     Nasal mucosa boggy w/o DC    Small red perioral papules             Assessment/Plan:         1. Eye irritation  Polyethyl Glyc-Propyl Glyc PF (SYSTANE) 0.4-0.3 % Solution   2. Perioral dermatitis  doxycycline (VIBRAMYCIN) 100 MG Tab   3. Nasal " congestion  fluticasone (FLONASE) 50 MCG/ACT nasal spray             Dx & d/c instructions discussed w/ patient and/or family members. Follow up w/ Prvt Dr or here in 3-4 days if not getting better, sooner if needed,  ER if worse and UC/PCP unavailable.        Possible side effects (i.e. Rash, GI upset/constipation, sedation, elevation of BP or sugars) of any medications given discussed.

## 2018-08-15 ENCOUNTER — APPOINTMENT (OUTPATIENT)
Dept: MEDICAL GROUP | Facility: PHYSICIAN GROUP | Age: 48
End: 2018-08-15
Payer: MEDICARE

## 2020-11-30 ENCOUNTER — APPOINTMENT (RX ONLY)
Dept: URBAN - METROPOLITAN AREA CLINIC 50 | Facility: CLINIC | Age: 50
Setting detail: DERMATOLOGY
End: 2020-11-30

## 2020-11-30 DIAGNOSIS — D22 MELANOCYTIC NEVI: ICD-10-CM

## 2020-11-30 DIAGNOSIS — B35.6 TINEA CRURIS: ICD-10-CM

## 2020-11-30 PROBLEM — D22.9 MELANOCYTIC NEVI, UNSPECIFIED: Status: ACTIVE | Noted: 2020-11-30

## 2020-11-30 PROCEDURE — 99203 OFFICE O/P NEW LOW 30 MIN: CPT

## 2020-11-30 PROCEDURE — ? PRESCRIPTION

## 2020-11-30 PROCEDURE — ? COUNSELING

## 2020-11-30 RX ORDER — KETOCONAZOLE 20 MG/ML
SHAMPOO, SUSPENSION TOPICAL
Qty: 1 | Refills: 2 | Status: ERX | COMMUNITY
Start: 2020-11-30

## 2020-11-30 RX ORDER — CLOTRIMAZOLE 10 MG/G
CREAM TOPICAL BID
Qty: 1 | Refills: 5 | Status: ERX | COMMUNITY
Start: 2020-11-30

## 2020-11-30 RX ADMIN — CLOTRIMAZOLE: 10 CREAM TOPICAL at 00:00

## 2020-11-30 RX ADMIN — KETOCONAZOLE: 20 SHAMPOO, SUSPENSION TOPICAL at 00:00

## 2020-11-30 NOTE — PROCEDURE: MIPS QUALITY
Detail Level: Detailed
Quality 226: Preventive Care And Screening: Tobacco Use: Screening And Cessation Intervention: Patient screened for tobacco use and is an ex/non-smoker
Quality 110: Preventive Care And Screening: Influenza Immunization: Influenza Immunization Administered during Influenza season
Quality 431: Preventive Care And Screening: Unhealthy Alcohol Use - Screening: Unhealthy alcohol use screening not performed, reason not otherwise specified
Quality 130: Documentation Of Current Medications In The Medical Record: Current Medications Documented